# Patient Record
Sex: FEMALE | Race: WHITE | NOT HISPANIC OR LATINO | Employment: UNEMPLOYED | URBAN - METROPOLITAN AREA
[De-identification: names, ages, dates, MRNs, and addresses within clinical notes are randomized per-mention and may not be internally consistent; named-entity substitution may affect disease eponyms.]

---

## 2020-06-04 ENCOUNTER — TELEMEDICINE (OUTPATIENT)
Dept: FAMILY MEDICINE CLINIC | Facility: CLINIC | Age: 55
End: 2020-06-04

## 2020-06-04 DIAGNOSIS — G25.81 RESTLESS LEG SYNDROME: ICD-10-CM

## 2020-06-04 DIAGNOSIS — E11.40 TYPE 2 DIABETES MELLITUS WITH DIABETIC NEUROPATHY, WITHOUT LONG-TERM CURRENT USE OF INSULIN (HCC): Primary | ICD-10-CM

## 2020-06-04 DIAGNOSIS — E53.8 VITAMIN B12 DEFICIENCY: ICD-10-CM

## 2020-06-04 DIAGNOSIS — E78.5 HYPERLIPIDEMIA, UNSPECIFIED HYPERLIPIDEMIA TYPE: ICD-10-CM

## 2020-06-04 DIAGNOSIS — I10 ESSENTIAL HYPERTENSION: ICD-10-CM

## 2020-06-04 DIAGNOSIS — G47.00 INSOMNIA, UNSPECIFIED TYPE: ICD-10-CM

## 2020-06-04 DIAGNOSIS — N95.1 PERIMENOPAUSE: ICD-10-CM

## 2020-06-04 PROBLEM — G43.909 MIGRAINES: Status: ACTIVE | Noted: 2020-06-04

## 2020-06-04 PROBLEM — T78.40XA ALLERGIES: Status: ACTIVE | Noted: 2020-06-04

## 2020-06-04 PROCEDURE — 99203 OFFICE O/P NEW LOW 30 MIN: CPT | Performed by: NURSE PRACTITIONER

## 2020-06-04 RX ORDER — PRAMIPEXOLE DIHYDROCHLORIDE 0.12 MG/1
0.12 TABLET ORAL NIGHTLY
Qty: 30 TABLET | Refills: 1 | Status: SHIPPED | OUTPATIENT
Start: 2020-06-04 | End: 2020-07-16 | Stop reason: SDUPTHER

## 2020-06-04 RX ORDER — CYCLOBENZAPRINE HCL 5 MG
TABLET ORAL
COMMUNITY
Start: 2019-07-11 | End: 2020-06-04 | Stop reason: SDUPTHER

## 2020-06-04 RX ORDER — CYANOCOBALAMIN (VITAMIN B-12) 1000 MCG
TABLET ORAL
COMMUNITY
End: 2020-06-12

## 2020-06-04 RX ORDER — OLMESARTAN MEDOXOMIL 40 MG/1
TABLET ORAL
COMMUNITY
End: 2020-06-04 | Stop reason: SDUPTHER

## 2020-06-04 RX ORDER — MULTIVITAMIN
TABLET ORAL
COMMUNITY
End: 2020-07-28

## 2020-06-04 RX ORDER — ATORVASTATIN CALCIUM 20 MG/1
20 TABLET, FILM COATED ORAL NIGHTLY
Qty: 30 TABLET | Refills: 1 | Status: SHIPPED | OUTPATIENT
Start: 2020-06-04 | End: 2020-07-28 | Stop reason: SDUPTHER

## 2020-06-04 RX ORDER — SUMATRIPTAN 50 MG/1
TABLET, FILM COATED ORAL
COMMUNITY

## 2020-06-04 RX ORDER — ETHYNODIOL DIACETATE AND ETHINYL ESTRADIOL 1 MG-35MCG
1 KIT ORAL DAILY
Qty: 28 TABLET | Refills: 0 | Status: SHIPPED | OUTPATIENT
Start: 2020-06-04 | End: 2020-07-07

## 2020-06-04 RX ORDER — CYCLOBENZAPRINE HCL 5 MG
5 TABLET ORAL 3 TIMES DAILY PRN
Qty: 90 TABLET | Refills: 1 | Status: SHIPPED | OUTPATIENT
Start: 2020-06-04 | End: 2020-11-13 | Stop reason: SDUPTHER

## 2020-06-04 RX ORDER — CHLORTHALIDONE 25 MG/1
TABLET ORAL
COMMUNITY
End: 2020-06-04 | Stop reason: SDUPTHER

## 2020-06-04 RX ORDER — ATORVASTATIN CALCIUM 20 MG/1
TABLET, FILM COATED ORAL
COMMUNITY
End: 2020-06-04 | Stop reason: SDUPTHER

## 2020-06-04 RX ORDER — QUETIAPINE FUMARATE 25 MG/1
TABLET, FILM COATED ORAL
COMMUNITY
End: 2020-06-04 | Stop reason: SDUPTHER

## 2020-06-04 RX ORDER — FLUTICASONE PROPIONATE 50 MCG
SPRAY, SUSPENSION (ML) NASAL
COMMUNITY

## 2020-06-04 RX ORDER — QUETIAPINE FUMARATE 25 MG/1
25 TABLET, FILM COATED ORAL NIGHTLY
Qty: 30 TABLET | Refills: 1 | Status: SHIPPED | OUTPATIENT
Start: 2020-06-04 | End: 2020-07-28 | Stop reason: SDUPTHER

## 2020-06-04 RX ORDER — CLONAZEPAM 0.5 MG/1
TABLET ORAL
COMMUNITY
End: 2020-06-04

## 2020-06-04 RX ORDER — CHLORTHALIDONE 25 MG/1
25 TABLET ORAL DAILY
Qty: 30 TABLET | Refills: 1 | Status: SHIPPED | OUTPATIENT
Start: 2020-06-04 | End: 2020-06-12

## 2020-06-04 RX ORDER — ETHYNODIOL DIACETATE AND ETHINYL ESTRADIOL 1 MG-35MCG
KIT ORAL
COMMUNITY
End: 2020-06-04 | Stop reason: SDUPTHER

## 2020-06-04 RX ORDER — OLMESARTAN MEDOXOMIL 40 MG/1
40 TABLET ORAL DAILY
Qty: 30 TABLET | Refills: 1 | Status: SHIPPED | OUTPATIENT
Start: 2020-06-04 | End: 2020-07-28 | Stop reason: SDUPTHER

## 2020-06-04 RX ORDER — COVID-19 ANTIGEN TEST
KIT MISCELLANEOUS
COMMUNITY
End: 2020-06-12

## 2020-06-04 NOTE — PROGRESS NOTES
Subjective   Erin Bailey is a 54 y.o. female.     Chief Complaint   Patient presents with   • Diabetes   • Hypertension   • Hyperlipidemia     You have chosen to receive care through a telehealth visit.  Do you consent to use a video/audio connection for your medical care today? Yes    This is my first time seeing this patient.   History of Present Illness   Diabetes Mellitus Type II, Initial: Patient here for evaluation of Type 2 diabetes mellitus. Symptoms: neuropathy. Home monitoring: The patient is not checking blood sugars at home. Medications: The patient is adherent with their medication regimen. Medication(s): metformin. Due for: A1c, urine microalbumin and foot exam. Patient is not currently exercising.     Hypertension: Patient here for evaluation of essential hypertension. Blood pressure is well controlled at home.She is not exercising and is adherent to low salt diet. Home monitoring: The patient is not checking blood pressure at home. Symptoms: none. Medications: The patient is adherent with their medication regimen. Medication(s): Benicar and chlorthalidone. The patient is due for serum creatinine and lipid panel.    Hyperlipidemia: Symptoms: none. Medications:The patient is adherent with their medication regimen. Medication(s): statin. Her last labs are not available for review at this time. The patient is due for lipid panel and liver function tests.    Restless leg syndrome: Patient reports doing poorly. Current treatment includes clonazepam.    Insomnia: Patient reports doing fairly well. Current treatment includes Seroquel. Patient is adherent to medication regimen.     Migraine headache: Patient reports doing well. Current treatment includes sumatriptan.      The following portions of the patient's history were reviewed and updated as appropriate: allergies, current medications, past family history, past medical history, past social history, past surgical history and problem list.    No past  medical history on file.    No past surgical history on file.    No family history on file.    Social History     Socioeconomic History   • Marital status:      Spouse name: Not on file   • Number of children: Not on file   • Years of education: Not on file   • Highest education level: Not on file       Review of Systems   Eyes: Negative for visual disturbance.   Cardiovascular: Negative for chest pain and leg swelling.   Endocrine: Negative for polydipsia and polyuria.   Musculoskeletal: Negative for myalgias.   Neurological: Negative for headache.       Objective   There were no vitals filed for this visit.   There is no height or weight on file to calculate BMI.  Physical Exam   Constitutional: She is oriented to person, place, and time. She appears well-developed and well-nourished.   Pulmonary/Chest: Effort normal.   Neurological: She is alert and oriented to person, place, and time.   Psychiatric: She has a normal mood and affect.         Assessment/Plan   Erin was seen today for diabetes, hypertension and hyperlipidemia.    Diagnoses and all orders for this visit:    Type 2 diabetes mellitus with diabetic neuropathy, without long-term current use of insulin (CMS/[facility])  Comments:  - Patient received pneumococcal vaccination with prior PCP. Will obtain records from previous PCP.   Orders:  -     Hemoglobin A1c  -     Microalbumin / Creatinine Urine Ratio - Urine, Clean Catch  -     Ambulatory Referral for Diabetic Eye Exam-Ophthalmology  -     metFORMIN (GLUCOPHAGE) 1000 MG tablet; Take 1 tablet by mouth Every Night.    Essential hypertension  -     chlorthalidone (HYGROTON) 25 MG tablet; Take 1 tablet by mouth Daily.  -     olmesartan (BENICAR) 40 MG tablet; Take 1 tablet by mouth Daily.    Hyperlipidemia, unspecified hyperlipidemia type  -     Lipid Panel With / Chol / HDL Ratio  -     Comprehensive Metabolic Panel  -     atorvastatin (LIPITOR) 20 MG tablet; Take 1 tablet by mouth Every  Night.    Restless leg syndrome  Comments:  - Patient aware that I cannot refill clonazepam and is agreeable to try pramipexole.  - Patient will call in 1 week if dose needs to be increased.   Orders:  -     pramipexole (Mirapex) 0.125 MG tablet; Take 1 tablet by mouth Every Night.    Insomnia, unspecified type  -     QUEtiapine (SEROquel) 25 MG tablet; Take 1 tablet by mouth Every Night.    Vitamin B12 deficiency  -     Vitamin B12    Perimenopause  -     ethynodiol-ethinyl estradiol (KELNOR,ZOVIA) 1-35 MG-MCG per tablet; Take 1 tablet by mouth Daily.  -     Ambulatory Referral to Obstetrics / Gynecology    Other orders  -     cyclobenzaprine (FLEXERIL) 5 MG tablet; Take 1 tablet by mouth 3 (Three) Times a Day As Needed for Muscle Spasms.    Follow-up pending lab results.     Approximately 30 minutes spent with patient via video.

## 2020-06-12 ENCOUNTER — TELEPHONE (OUTPATIENT)
Dept: FAMILY MEDICINE CLINIC | Facility: CLINIC | Age: 55
End: 2020-06-12

## 2020-06-12 DIAGNOSIS — R79.89 ELEVATED SERUM CREATININE: ICD-10-CM

## 2020-06-12 DIAGNOSIS — E83.52 HYPERCALCEMIA: Primary | ICD-10-CM

## 2020-06-12 DIAGNOSIS — R79.89 ABNORMAL LIVER FUNCTION TESTS: ICD-10-CM

## 2020-06-12 LAB
ALBUMIN SERPL-MCNC: 4.7 G/DL (ref 3.5–5.2)
ALBUMIN/CREAT UR: 98 MG/G CREAT (ref 0–29)
ALBUMIN/GLOB SERPL: 1.2 G/DL
ALP SERPL-CCNC: 65 U/L (ref 39–117)
ALT SERPL-CCNC: 36 U/L (ref 1–33)
AST SERPL-CCNC: 39 U/L (ref 1–32)
BILIRUB SERPL-MCNC: 0.3 MG/DL (ref 0.2–1.2)
BUN SERPL-MCNC: 25 MG/DL (ref 6–20)
BUN/CREAT SERPL: 15.7 (ref 7–25)
CALCIUM SERPL-MCNC: 13.4 MG/DL (ref 8.6–10.5)
CHLORIDE SERPL-SCNC: 95 MMOL/L (ref 98–107)
CHOLEST SERPL-MCNC: 147 MG/DL (ref 0–200)
CHOLEST/HDLC SERPL: 2.58 {RATIO}
CO2 SERPL-SCNC: 25 MMOL/L (ref 22–29)
CREAT SERPL-MCNC: 1.59 MG/DL (ref 0.57–1)
CREAT UR-MCNC: 423.8 MG/DL
GLOBULIN SER CALC-MCNC: 3.8 GM/DL
GLUCOSE SERPL-MCNC: 132 MG/DL (ref 65–99)
HBA1C MFR BLD: 6.7 % (ref 4.8–5.6)
HDLC SERPL-MCNC: 57 MG/DL (ref 40–60)
LDLC SERPL CALC-MCNC: 33 MG/DL (ref 0–100)
MICROALBUMIN UR-MCNC: 416.5 UG/ML
POTASSIUM SERPL-SCNC: 4.4 MMOL/L (ref 3.5–5.2)
PROT SERPL-MCNC: 8.5 G/DL (ref 6–8.5)
SODIUM SERPL-SCNC: 137 MMOL/L (ref 136–145)
TRIGL SERPL-MCNC: 287 MG/DL (ref 0–150)
VIT B12 SERPL-MCNC: >2000 PG/ML (ref 211–946)
VLDLC SERPL CALC-MCNC: 57.4 MG/DL

## 2020-06-12 NOTE — TELEPHONE ENCOUNTER
What is your current height? Diabetes is well controlled with A1c of 6.7% but due to kidney function will need to discontinue metformin at this time. Urine microalbumin is elevated. Triglycerides are elevated so may consider adding fish oil or fenofibrate. Creatinine is elevated at 1.59 so would recommend discontinuing naproxen and all NSAIDs as well as chlorthalidone. Calcium is elevated at 13.4 so will need to discontinue multivitamin, increase water intake and repeat calcium with PTH on Monday. Liver function tests are elevated so will need to repeat liver function tests with hepatitis panel. May consider RUQ US. B12 is high so would recommend discontinuing B12 supplementation.    Patient's current height is 67 inches. Will continue metformin at this time, but if creatinine remains elevated in 2 weeks then will discontinue. Patient will start fish oil for triglycerides. Patient has been taking Tums so will switch to Pepcid.

## 2020-06-15 ENCOUNTER — RESULTS ENCOUNTER (OUTPATIENT)
Dept: FAMILY MEDICINE CLINIC | Facility: CLINIC | Age: 55
End: 2020-06-15

## 2020-06-15 DIAGNOSIS — E83.52 HYPERCALCEMIA: ICD-10-CM

## 2020-06-16 LAB
CALCIUM SERPL-MCNC: 10.1 MG/DL (ref 8.7–10.2)
INTACT PTH: ABNORMAL
PTH-INTACT SERPL-MCNC: 10 PG/ML (ref 15–65)

## 2020-06-18 ENCOUNTER — TELEPHONE (OUTPATIENT)
Dept: FAMILY MEDICINE CLINIC | Facility: CLINIC | Age: 55
End: 2020-06-18

## 2020-06-18 NOTE — TELEPHONE ENCOUNTER
Spoke to PT. They are aware of all results and understand.  Will call back in a month to schedule labs.

## 2020-07-05 DIAGNOSIS — N95.1 PERIMENOPAUSE: ICD-10-CM

## 2020-07-07 RX ORDER — ETHYNODIOL DIACETATE AND ETHINYL ESTRADIOL 1 MG-35MCG
KIT ORAL
Qty: 28 TABLET | Refills: 0 | Status: SHIPPED | OUTPATIENT
Start: 2020-07-07 | End: 2020-07-28 | Stop reason: SDUPTHER

## 2020-07-16 ENCOUNTER — TELEPHONE (OUTPATIENT)
Dept: FAMILY MEDICINE CLINIC | Facility: CLINIC | Age: 55
End: 2020-07-16

## 2020-07-16 DIAGNOSIS — G25.81 RESTLESS LEG SYNDROME: ICD-10-CM

## 2020-07-16 RX ORDER — PRAMIPEXOLE DIHYDROCHLORIDE 0.25 MG/1
0.25 TABLET ORAL NIGHTLY
Qty: 30 TABLET | Refills: 1 | Status: SHIPPED | OUTPATIENT
Start: 2020-07-16 | End: 2020-09-10

## 2020-07-16 NOTE — TELEPHONE ENCOUNTER
Patient would like to discuss the message about her Seroquel which she has been taking for years, she wants to know what the options are.

## 2020-07-28 ENCOUNTER — OFFICE VISIT (OUTPATIENT)
Dept: FAMILY MEDICINE CLINIC | Facility: CLINIC | Age: 55
End: 2020-07-28

## 2020-07-28 VITALS
OXYGEN SATURATION: 97 % | HEART RATE: 126 BPM | TEMPERATURE: 97.6 F | WEIGHT: 284.4 LBS | HEIGHT: 67 IN | BODY MASS INDEX: 44.64 KG/M2 | DIASTOLIC BLOOD PRESSURE: 82 MMHG | SYSTOLIC BLOOD PRESSURE: 130 MMHG

## 2020-07-28 DIAGNOSIS — I10 ESSENTIAL HYPERTENSION: Primary | ICD-10-CM

## 2020-07-28 DIAGNOSIS — E78.5 HYPERLIPIDEMIA, UNSPECIFIED HYPERLIPIDEMIA TYPE: ICD-10-CM

## 2020-07-28 DIAGNOSIS — R79.89 ABNORMAL LIVER FUNCTION TESTS: ICD-10-CM

## 2020-07-28 DIAGNOSIS — G47.00 INSOMNIA, UNSPECIFIED TYPE: ICD-10-CM

## 2020-07-28 DIAGNOSIS — N95.1 PERIMENOPAUSE: ICD-10-CM

## 2020-07-28 DIAGNOSIS — R79.89 LOW SERUM PARATHYROID HORMONE (PTH): ICD-10-CM

## 2020-07-28 PROCEDURE — 99213 OFFICE O/P EST LOW 20 MIN: CPT | Performed by: NURSE PRACTITIONER

## 2020-07-28 RX ORDER — QUETIAPINE FUMARATE 25 MG/1
25 TABLET, FILM COATED ORAL NIGHTLY
Qty: 30 TABLET | Refills: 1 | Status: SHIPPED | OUTPATIENT
Start: 2020-07-28 | End: 2020-10-12

## 2020-07-28 RX ORDER — ETHYNODIOL DIACETATE AND ETHINYL ESTRADIOL 1 MG-35MCG
1 KIT ORAL DAILY
Qty: 28 TABLET | Refills: 2 | Status: SHIPPED | OUTPATIENT
Start: 2020-07-28 | End: 2020-10-06

## 2020-07-28 RX ORDER — OLMESARTAN MEDOXOMIL 40 MG/1
40 TABLET ORAL DAILY
Qty: 30 TABLET | Refills: 2 | Status: SHIPPED | OUTPATIENT
Start: 2020-07-28 | End: 2020-10-27

## 2020-07-28 RX ORDER — ATORVASTATIN CALCIUM 20 MG/1
20 TABLET, FILM COATED ORAL NIGHTLY
Qty: 30 TABLET | Refills: 2 | Status: SHIPPED | OUTPATIENT
Start: 2020-07-28 | End: 2020-10-27

## 2020-07-28 NOTE — PROGRESS NOTES
Subjective   Erin Bailey is a 54 y.o. female.     Chief Complaint   Patient presents with   • Hypertension       History of Present Illness     Hypertension: Patient here for follow-up of essential hypertension. Blood pressure is well controlled at home.She is not exercising and is adherent to low salt diet. Home monitoring: The patient is not checking blood pressure at home. Symptoms: none. Medications: The patient is adherent with their medication regimen. Medication(s): ARB. The patient is due for Serum creatinine.    Hyperlipidemia: Symptoms: none. Medications:The patient is adherent with their medication regimen. Medication(s): statin and fish oil. Her last labs showed total cholesterol 147, HDL 57, LDL 33,  triglycerides 287. The patient is due for liver function tests .    The following portions of the patient's history were reviewed and updated as appropriate: allergies, current medications, past family history, past medical history, past social history, past surgical history and problem list.    Past Medical History:   Diagnosis Date   • Allergic        Past Surgical History:   Procedure Laterality Date   • BREAST SURGERY     • CHOLECYSTECTOMY     • HERNIA REPAIR     • TONSILLECTOMY         History reviewed. No pertinent family history.    Social History     Socioeconomic History   • Marital status:      Spouse name: Not on file   • Number of children: Not on file   • Years of education: Not on file   • Highest education level: Not on file   Tobacco Use   • Smoking status: Never Smoker   • Smokeless tobacco: Never Used   Substance and Sexual Activity   • Alcohol use: Yes     Comment: socially    • Drug use: Never       Review of Systems   Eyes: Negative for visual disturbance.   Cardiovascular: Negative for chest pain and leg swelling.       Objective   Vitals:    07/28/20 1301   BP: 130/82   Pulse: (!) 126   Temp: 97.6 °F (36.4 °C)   TempSrc: Temporal   SpO2: 97%   Weight: 129 kg (284 lb 6.4  "oz)   Height: 170.2 cm (67\")      Body mass index is 44.54 kg/m².  Physical Exam   Constitutional: She is oriented to person, place, and time. She appears well-developed and well-nourished.   Cardiovascular: Normal rate, regular rhythm and normal heart sounds.   Pulmonary/Chest: Effort normal and breath sounds normal.   Musculoskeletal: She exhibits no edema.   Neurological: She is alert and oriented to person, place, and time.   Skin: Skin is warm and dry.   Psychiatric: She has a normal mood and affect.   Nursing note and vitals reviewed.        Assessment/Plan   Erin was seen today for hypertension.    Diagnoses and all orders for this visit:    Essential hypertension  -     olmesartan (BENICAR) 40 MG tablet; Take 1 tablet by mouth Daily.  -     Comprehensive Metabolic Panel    Abnormal liver function tests  -     Comprehensive Metabolic Panel  -     Hepatitis panel, acute    Low serum parathyroid hormone (PTH)  -     PTH, Intact    Hyperlipidemia, unspecified hyperlipidemia type  -     atorvastatin (LIPITOR) 20 MG tablet; Take 1 tablet by mouth Every Night.    Insomnia, unspecified type  -     QUEtiapine (SEROquel) 25 MG tablet; Take 1 tablet by mouth Every Night.    Perimenopause  -     ethynodiol-ethinyl estradiol (Kelnor 1/35) 1-35 MG-MCG per tablet; Take 1 tablet by mouth Daily.               "

## 2020-07-29 LAB
ALBUMIN SERPL-MCNC: 4.2 G/DL (ref 3.5–5.2)
ALBUMIN/GLOB SERPL: 1.3 G/DL
ALP SERPL-CCNC: 61 U/L (ref 39–117)
ALT SERPL-CCNC: 16 U/L (ref 1–33)
AST SERPL-CCNC: 25 U/L (ref 1–32)
BILIRUB SERPL-MCNC: 0.2 MG/DL (ref 0–1.2)
BUN SERPL-MCNC: 19 MG/DL (ref 6–20)
BUN/CREAT SERPL: 14.8 (ref 7–25)
CALCIUM SERPL-MCNC: 10.1 MG/DL (ref 8.6–10.5)
CHLORIDE SERPL-SCNC: 99 MMOL/L (ref 98–107)
CO2 SERPL-SCNC: 22.1 MMOL/L (ref 22–29)
CREAT SERPL-MCNC: 1.28 MG/DL (ref 0.57–1)
GLOBULIN SER CALC-MCNC: 3.2 GM/DL
GLUCOSE SERPL-MCNC: 122 MG/DL (ref 65–99)
HAV IGM SERPL QL IA: NEGATIVE
HBV CORE IGM SERPL QL IA: NEGATIVE
HBV SURFACE AG SERPL QL IA: NEGATIVE
HCV AB S/CO SERPL IA: <0.1 S/CO RATIO (ref 0–0.9)
POTASSIUM SERPL-SCNC: 5 MMOL/L (ref 3.5–5.2)
PROT SERPL-MCNC: 7.4 G/DL (ref 6–8.5)
PTH-INTACT SERPL-MCNC: 15 PG/ML (ref 15–65)
SODIUM SERPL-SCNC: 135 MMOL/L (ref 136–145)

## 2020-07-30 ENCOUNTER — TELEPHONE (OUTPATIENT)
Dept: FAMILY MEDICINE CLINIC | Facility: CLINIC | Age: 55
End: 2020-07-30

## 2020-07-30 RX ORDER — PIOGLITAZONEHYDROCHLORIDE 15 MG/1
15 TABLET ORAL DAILY
Qty: 30 TABLET | Refills: 2 | Status: SHIPPED | OUTPATIENT
Start: 2020-07-30 | End: 2020-10-27

## 2020-07-30 NOTE — TELEPHONE ENCOUNTER
Kidney function tests have improved but will still need to discontinue metformin at this time. We can consider adding Januvia 50 mg daily or Actos 15 mg daily and repeat A1c in 3 months. Calcium remains normal. Liver function tests have returned to normal. Hepatitis panel is negative. PTH has returned to normal.    Patient aware of results and recommendations.

## 2020-09-10 DIAGNOSIS — G25.81 RESTLESS LEG SYNDROME: ICD-10-CM

## 2020-09-10 RX ORDER — PRAMIPEXOLE DIHYDROCHLORIDE 0.25 MG/1
TABLET ORAL
Qty: 30 TABLET | Refills: 0 | Status: SHIPPED | OUTPATIENT
Start: 2020-09-10 | End: 2020-09-21

## 2020-09-21 DIAGNOSIS — G25.81 RESTLESS LEG SYNDROME: ICD-10-CM

## 2020-09-21 RX ORDER — PRAMIPEXOLE DIHYDROCHLORIDE 0.25 MG/1
TABLET ORAL
Qty: 30 TABLET | Refills: 0 | Status: SHIPPED | OUTPATIENT
Start: 2020-09-21 | End: 2020-10-12

## 2020-10-06 ENCOUNTER — OFFICE VISIT (OUTPATIENT)
Dept: OBSTETRICS AND GYNECOLOGY | Age: 55
End: 2020-10-06

## 2020-10-06 VITALS
SYSTOLIC BLOOD PRESSURE: 130 MMHG | DIASTOLIC BLOOD PRESSURE: 82 MMHG | HEIGHT: 67 IN | WEIGHT: 289.2 LBS | BODY MASS INDEX: 45.39 KG/M2

## 2020-10-06 DIAGNOSIS — Z12.31 SCREENING MAMMOGRAM, ENCOUNTER FOR: ICD-10-CM

## 2020-10-06 DIAGNOSIS — Z12.11 SCREENING FOR COLON CANCER: ICD-10-CM

## 2020-10-06 DIAGNOSIS — Z01.419 WELL WOMAN EXAM WITH ROUTINE GYNECOLOGICAL EXAM: Primary | ICD-10-CM

## 2020-10-06 PROCEDURE — 99396 PREV VISIT EST AGE 40-64: CPT | Performed by: NURSE PRACTITIONER

## 2020-10-06 RX ORDER — CHLORAL HYDRATE 500 MG
CAPSULE ORAL
COMMUNITY

## 2020-10-06 NOTE — PROGRESS NOTES
[facility]  Routine Annual Visit    10/6/2020    Patient: Erin Bailey          MR#:[id number]      History of Present Illness    55 y.o. female  who presents for annual exam as a new patient.    She reports consistent GYN care in [place], [place]- Dr. Elisa Manriquez.  Denies any significant GYN history or abnormal pap smears.  Last GYN visit 2019- reports normal exam and pap    Erin reports last mammogram 2019 and normal. She states in 2016 she had bilateral breast excisions followed by an abscess of left breast. Report path returned benign and she has no breast complaints or concerns today.  Reports last colonoscopy over 10 years ago- accepts referral today    Erin is currently on oral contraceptives. Advised to discontinue with her age and co-morbidities. She understands and will d/c. She is not sexually active.  She has been cycling on the oral contraceptives and reports over the past few months the bleeding has been irregular and unpredictable    She has been established with primary care since moving to [place].    Patient's last menstrual period was 2020 (exact date).  Obstetric History:  OB History        0    Para   0    Term   0       0    AB   0    Living   0       SAB   0    TAB   0    Ectopic   0    Molar   0    Multiple   0    Live Births   0               Menstrual History:     Patient's last menstrual period was 2020 (exact date).       Sexual History:       ________________________________________  Patient Active Problem List   Diagnosis   • Allergies   • Diabetic neuropathy (CMS/HCC)   • High blood pressure   • Insomnia   • Migraines   • Restless leg syndrome   • Type 2 diabetes mellitus (CMS/HCC)       Past Medical History:   Diagnosis Date   • Allergic    • Diabetes mellitus (CMS/HCC)    • Hyperlipemia    • Hypertension    • Insomnia    • Knee injury     left   • Migraine    • Restless leg        Past Surgical History:   Procedure Laterality  Date   • BREAST BIOPSY Bilateral     with excision   • BREAST SURGERY     • CHOLECYSTECTOMY     • CHOLECYSTECTOMY     • HERNIA REPAIR     • HERNIA REPAIR     • TONSILLECTOMY         Social History     Tobacco Use   Smoking Status Never Smoker   Smokeless Tobacco Never Used       Family History   Problem Relation Age of Onset   • Prostate cancer Father    • Colon cancer Sister    • Diabetes Maternal Grandmother        Prior to Admission medications    Medication Sig Start Date End Date Taking? Authorizing Provider   atorvastatin (LIPITOR) 20 MG tablet Take 1 tablet by mouth Every Night. 7/28/20  Yes Essence Neal APRN   Cetirizine HCl (ZyrTEC Allergy) 10 MG capsule    Yes Tiny Weems MD   cyclobenzaprine (FLEXERIL) 5 MG tablet Take 1 tablet by mouth 3 (Three) Times a Day As Needed for Muscle Spasms. 6/4/20  Yes Essence Neal APRN   ethynodiol-ethinyl estradiol (Kelnor 1/35) 1-35 MG-MCG per tablet Take 1 tablet by mouth Daily. 7/28/20  Yes Essence Neal APRN   fluticasone (FLONASE) 50 MCG/ACT nasal spray    Yes Tiny Weems MD   olmesartan (BENICAR) 40 MG tablet Take 1 tablet by mouth Daily. 7/28/20  Yes Essence Neal APRN   Omega-3 Fatty Acids (fish oil) 1000 MG capsule capsule Take  by mouth Daily With Breakfast.   Yes Tiny Weems MD   pioglitazone (Actos) 15 MG tablet Take 1 tablet by mouth Daily. 7/30/20  Yes Essence Neal APRN   pramipexole (MIRAPEX) 0.25 MG tablet TAKE 1 TABLET BY MOUTH ONCE DAILY AT NIGHT 9/21/20  Yes Essence Neal APRN   QUEtiapine (SEROquel) 25 MG tablet Take 1 tablet by mouth Every Night. 7/28/20  Yes Essence Neal APRN   SUMAtriptan (IMITREX) 50 MG tablet    Yes Tiny Weems MD     ________________________________________    Current contraception: abstinence  History of abnormal Pap smear: no  Family history of uterine or ovarian cancer: no  Family History of colon cancer/colon polyps: yes - sister- colonoscopy referral  "placed  History of abnormal mammogram: yes - reports follow ups normal- 3D mammogram placed today  History of abnormal lipids: yes - on lipitor 20    The following portions of the patient's history were reviewed and updated as appropriate: allergies, current medications, past family history, past medical history, past social history, past surgical history and problem list.    Review of Systems   Constitutional: Negative.    HENT: Negative.    Eyes: Negative for visual disturbance.   Respiratory: Negative for cough, shortness of breath and wheezing.    Cardiovascular: Negative for chest pain, palpitations and leg swelling.   Gastrointestinal: Negative for abdominal distention, abdominal pain, blood in stool, constipation, diarrhea, nausea and vomiting.   Endocrine: Negative for cold intolerance and heat intolerance.   Genitourinary: Positive for menstrual problem. Negative for difficulty urinating, dyspareunia, dysuria, frequency, genital sores, hematuria, pelvic pain, urgency, vaginal bleeding, vaginal discharge and vaginal pain.   Musculoskeletal: Negative.    Skin: Negative.    Neurological: Negative for dizziness, weakness, light-headedness, numbness and headaches.   Hematological: Negative.    Psychiatric/Behavioral: Negative.    Breasts: negative for lumps skin changes, dimpling, swelling, nipple changes/discharge bilaterally         Objective   Physical Exam    /82   Ht 170.2 cm (67\")   Wt 131 kg (289 lb 3.2 oz)   LMP 09/22/2020 (Exact Date)   BMI 45.30 kg/m²    BP Readings from Last 3 Encounters:   10/06/20 130/82   07/28/20 130/82      Wt Readings from Last 3 Encounters:   10/06/20 131 kg (289 lb 3.2 oz)   07/28/20 129 kg (284 lb 6.4 oz)        BMI: Estimated body mass index is 45.3 kg/m² as calculated from the following:    Height as of this encounter: 170.2 cm (67\").    Weight as of this encounter: 131 kg (289 lb 3.2 oz).        General:   alert, appears stated age and cooperative   Heart: " regular rate and rhythm, S1, S2 normal, no murmur, click, rub or gallop   Lungs: clear to auscultation bilaterally   Abdomen: soft, non-tender, without masses or organomegaly   Breast: inspection negative, no nipple discharge or bleeding, no masses or nodularity palpable   Vulva: normal   Vagina: normal mucosa, normal discharge   Cervix: no cervical motion tenderness and no lesions   Uterus: exam is limited habitus    Adnexa: exam limited by habitus     Assessment:    1. Normal annual exam   2. AUB on OCPs- discontinue OCPs. Return for US and further evaluation 2-4 weeks. Advised her AUB needs to be further evaluated. She understands  3. Healthy lifestyle modifications discussed, counseled on self breast exams and bone health    All of the patient's questions were addressed and answered, I have encouraged her to call for today's test results if she has not received them within 10 days.  Patient is advised to call with any change in her condition or with any other questions, otherwise return in 12 months for annual examination.    Plan:    []  Rx:   [x]  Mammogram request made  [x]  PAP done  []  Occult fecal blood test ([facility])  []  Labs:   []  GC/Chl/TV  []  DEXA scan   [x]  Referral for colonoscopy:           ETHEL Mendosa  10/6/2020 15:16 EDT

## 2020-10-08 LAB
CYTOLOGIST CVX/VAG CYTO: NORMAL
CYTOLOGY CVX/VAG DOC CYTO: NORMAL
CYTOLOGY CVX/VAG DOC THIN PREP: NORMAL
DX ICD CODE: NORMAL
HIV 1 & 2 AB SER-IMP: NORMAL
HPV I/H RISK 4 DNA CVX QL PROBE+SIG AMP: NEGATIVE
OTHER STN SPEC: NORMAL
STAT OF ADQ CVX/VAG CYTO-IMP: NORMAL

## 2020-10-09 ENCOUNTER — TELEPHONE (OUTPATIENT)
Dept: OBSTETRICS AND GYNECOLOGY | Age: 55
End: 2020-10-09

## 2020-10-09 NOTE — TELEPHONE ENCOUNTER
----- Message from ETHEL Carvajal sent at 10/9/2020  8:34 AM EDT -----  Please inform patient her pap smear returned negative (normal). Thank you.

## 2020-10-12 DIAGNOSIS — G25.81 RESTLESS LEG SYNDROME: ICD-10-CM

## 2020-10-12 DIAGNOSIS — G47.00 INSOMNIA, UNSPECIFIED TYPE: ICD-10-CM

## 2020-10-12 RX ORDER — QUETIAPINE FUMARATE 25 MG/1
TABLET, FILM COATED ORAL
Qty: 30 TABLET | Refills: 0 | Status: SHIPPED | OUTPATIENT
Start: 2020-10-12 | End: 2020-11-09

## 2020-10-12 RX ORDER — PRAMIPEXOLE DIHYDROCHLORIDE 0.5 MG/1
0.5 TABLET ORAL NIGHTLY
Qty: 30 TABLET | Refills: 3 | Status: SHIPPED | OUTPATIENT
Start: 2020-10-12 | End: 2021-02-04

## 2020-10-13 ENCOUNTER — TELEPHONE (OUTPATIENT)
Dept: OBSTETRICS AND GYNECOLOGY | Age: 55
End: 2020-10-13

## 2020-10-13 PROBLEM — N93.9 ABNORMAL UTERINE BLEEDING (AUB): Status: ACTIVE | Noted: 2020-10-13

## 2020-10-13 NOTE — TELEPHONE ENCOUNTER
Patient called and stated she has had 3 periods this month.  She would like for you to call her about taking iron pills.

## 2020-10-13 NOTE — TELEPHONE ENCOUNTER
Called pt to offer her appt on Friday 10/16 at 11:15, she will come in early to be worked in for US, per Berenice.

## 2020-10-13 NOTE — TELEPHONE ENCOUNTER
Rosy- would you please call patient and check in. I don't see a recent CBC or hemoglobin in her chart. Dr. Delgado may get CBC at her upcoming visit. Of course, if bleeding has been significant (more than 1 pad per hour) or she is experiencing lightheadedness, dizziness she would need immediate evaluation in ER. Otherwise I recommend keeping her upcoming appt with Dr. Delgado. Thank you.

## 2020-10-16 ENCOUNTER — PROCEDURE VISIT (OUTPATIENT)
Dept: OBSTETRICS AND GYNECOLOGY | Age: 55
End: 2020-10-16

## 2020-10-16 ENCOUNTER — OFFICE VISIT (OUTPATIENT)
Dept: OBSTETRICS AND GYNECOLOGY | Age: 55
End: 2020-10-16

## 2020-10-16 VITALS
DIASTOLIC BLOOD PRESSURE: 80 MMHG | HEIGHT: 67 IN | BODY MASS INDEX: 45.99 KG/M2 | WEIGHT: 293 LBS | SYSTOLIC BLOOD PRESSURE: 136 MMHG

## 2020-10-16 DIAGNOSIS — F50.89 PICA IN ADULTS: ICD-10-CM

## 2020-10-16 DIAGNOSIS — N93.9 ABNORMAL UTERINE BLEEDING (AUB): Primary | ICD-10-CM

## 2020-10-16 DIAGNOSIS — Z13.0 SCREENING, ANEMIA, DEFICIENCY, IRON: ICD-10-CM

## 2020-10-16 DIAGNOSIS — N92.1 MENORRHAGIA WITH IRREGULAR CYCLE: Primary | ICD-10-CM

## 2020-10-16 DIAGNOSIS — Z13.228 SCREENING FOR METABOLIC DISORDER: ICD-10-CM

## 2020-10-16 DIAGNOSIS — E11.9 TYPE 2 DIABETES MELLITUS WITHOUT COMPLICATION, WITHOUT LONG-TERM CURRENT USE OF INSULIN (HCC): ICD-10-CM

## 2020-10-16 PROCEDURE — 99214 OFFICE O/P EST MOD 30 MIN: CPT | Performed by: OBSTETRICS & GYNECOLOGY

## 2020-10-16 PROCEDURE — 76830 TRANSVAGINAL US NON-OB: CPT | Performed by: OBSTETRICS & GYNECOLOGY

## 2020-10-16 NOTE — PROGRESS NOTES
10/16/2020      Patient:  Erin Bailey   MR#:[id number]    Office note    Chief Complaint   Patient presents with   • abnormal menstrual cycle     pt got off of birth control and is having irregular menstrual cycles        Subjective     History of Present Illness  55 y.o. female  presents for follow-up visit with report of intermittent moderate abnormal uterine bleeding for the past month or so.  The patient stopped her oral contraceptive pills per nurse practitioner's instructions last visit.  The patient had a subsequent expected withdrawal bleed and then intermittent moderate abnormal uterine bleeding for several episodes in the month following.    Ultrasound today shows a small uterus with endometrial lining measuring 6.15 mm.    Past medical history is complicated by obesity and type 2 diabetes.    In the last month or so the patient's reported intermittent moderate symptoms of craving ice and pica type symptoms.  He also reports intermittent moderate exacerbation of her restless leg syndrome in the last month.    Patient Active Problem List   Diagnosis   • Allergies   • Diabetic neuropathy (CMS/HCC)   • High blood pressure   • Insomnia   • Migraines   • Restless leg syndrome   • Type 2 diabetes mellitus without complication, without long-term current use of insulin (CMS/HCC)   • Abnormal uterine bleeding (AUB)       Past Medical History:   Diagnosis Date   • Allergic    • Diabetes mellitus (CMS/HCC)    • Hyperlipemia    • Hypertension    • Insomnia    • Knee injury     left   • Migraine    • Restless leg      Past Surgical History:   Procedure Laterality Date   • BREAST BIOPSY Bilateral     with excision   • BREAST SURGERY     • CHOLECYSTECTOMY     • CHOLECYSTECTOMY     • HERNIA REPAIR     • HERNIA REPAIR     • TONSILLECTOMY       Obstetric History:  OB History        0    Para   0    Term   0       0    AB   0    Living   0       SAB   0    TAB   0    Ectopic   0    Molar   0     Multiple   0    Live Births   0               Menstrual History:     Patient's last menstrual period was 10/09/2020.       The patient has never been pregnant.  Family History   Problem Relation Age of Onset   • Prostate cancer Father    • Colon cancer Sister    • Diabetes Maternal Grandmother      Social History     Tobacco Use   • Smoking status: Never Smoker   • Smokeless tobacco: Never Used   Substance Use Topics   • Alcohol use: Yes     Comment: socially    • Drug use: Never     Sulfa antibiotics    Current Outpatient Medications:   •  atorvastatin (LIPITOR) 20 MG tablet, Take 1 tablet by mouth Every Night., Disp: 30 tablet, Rfl: 2  •  Cetirizine HCl (ZyrTEC Allergy) 10 MG capsule, , Disp: , Rfl:   •  cyclobenzaprine (FLEXERIL) 5 MG tablet, Take 1 tablet by mouth 3 (Three) Times a Day As Needed for Muscle Spasms., Disp: 90 tablet, Rfl: 1  •  fluticasone (FLONASE) 50 MCG/ACT nasal spray, , Disp: , Rfl:   •  olmesartan (BENICAR) 40 MG tablet, Take 1 tablet by mouth Daily., Disp: 30 tablet, Rfl: 2  •  Omega-3 Fatty Acids (fish oil) 1000 MG capsule capsule, Take  by mouth Daily With Breakfast., Disp: , Rfl:   •  pioglitazone (Actos) 15 MG tablet, Take 1 tablet by mouth Daily., Disp: 30 tablet, Rfl: 2  •  pramipexole (MIRAPEX) 0.5 MG tablet, Take 1 tablet by mouth Every Night., Disp: 30 tablet, Rfl: 3  •  QUEtiapine (SEROquel) 25 MG tablet, TAKE 1 TABLET BY MOUTH ONCE DAILY AT NIGHT, Disp: 30 tablet, Rfl: 0  •  SUMAtriptan (IMITREX) 50 MG tablet, , Disp: , Rfl:     The following portions of the patient's history were reviewed and updated as appropriate: allergies, current medications, past family history, past medical history, past social history, past surgical history and problem list.    Review of Systems   Constitutional: Negative.    Respiratory: Negative.    Cardiovascular: Negative.    Gastrointestinal: Negative.    Endocrine:        Pica symptoms craving ice   Genitourinary: Positive for menstrual problem and  "vaginal bleeding.   Neurological:        Moderate exacerbation of restless leg syndrome in the last month   Psychiatric/Behavioral: Negative.        BP Readings from Last 3 Encounters:   10/16/20 136/80   10/06/20 130/82   07/28/20 130/82      Wt Readings from Last 3 Encounters:   10/16/20 135 kg (296 lb 12.8 oz)   10/06/20 131 kg (289 lb 3.2 oz)   07/28/20 129 kg (284 lb 6.4 oz)      BMI: Estimated body mass index is 46.47 kg/m² as calculated from the following:    Height as of this encounter: 170.2 cm (67.01\").    Weight as of this encounter: 135 kg (296 lb 12.8 oz). BSA: Estimated body surface area is 2.39 meters squared as calculated from the following:    Height as of this encounter: 170.2 cm (67.01\").    Weight as of this encounter: 135 kg (296 lb 12.8 oz).    Objective   Physical Exam  Vitals signs and nursing note reviewed.   Constitutional:       Appearance: She is well-developed. She is obese. She is not ill-appearing.   HENT:      Head: Normocephalic and atraumatic.   Cardiovascular:      Rate and Rhythm: Normal rate.   Pulmonary:      Effort: Pulmonary effort is normal.   Abdominal:      General: Bowel sounds are normal. There is no distension.      Palpations: Abdomen is soft.      Tenderness: There is no abdominal tenderness.   Skin:     General: Skin is warm and dry.   Neurological:      Mental Status: She is alert and oriented to person, place, and time.   Psychiatric:         Behavior: Behavior normal.         Thought Content: Thought content normal.         Judgment: Judgment normal.         Assessment/Plan     Diagnoses and all orders for this visit:    1. Abnormal uterine bleeding (AUB) (Primary)  -     Follicle Stimulating Hormone    2. Screening, anemia, deficiency, iron  -     CBC (No Diff)    3. Pica in adults  -     Ferritin  -     CBC (No Diff)    4. Type 2 diabetes mellitus without complication, without long-term current use of insulin (CMS/[facility])  -     Hemoglobin A1c    5. Screening for " metabolic disorder  -     Comprehensive Metabolic Panel        Plan:  Evaluate FSH for perimenopausal bleeding.  Provera therapy may be indicated if the patient continues to have bleeding and has a perimenopausal FSH.  We discussed the possibility of need for endometrial sampling.  I do think it is unlikely the patient would have endometrial cancer having been on progesterone containing oral contraceptive pills.  With the patient's worsening symptoms of fatigue and new onset pica will she will be screened for anemia today.      No follow-ups on file.    Immanuel Delgado MD   10/16/2020 11:54 EDT

## 2020-10-17 LAB
ALBUMIN SERPL-MCNC: 4.1 G/DL (ref 3.5–5.2)
ALBUMIN/GLOB SERPL: 1.4 G/DL
ALP SERPL-CCNC: 80 U/L (ref 39–117)
ALT SERPL-CCNC: 30 U/L (ref 1–33)
AST SERPL-CCNC: 51 U/L (ref 1–32)
BILIRUB SERPL-MCNC: 0.2 MG/DL (ref 0–1.2)
BUN SERPL-MCNC: 16 MG/DL (ref 6–20)
BUN/CREAT SERPL: 14.8 (ref 7–25)
CALCIUM SERPL-MCNC: 9.8 MG/DL (ref 8.6–10.5)
CHLORIDE SERPL-SCNC: 98 MMOL/L (ref 98–107)
CO2 SERPL-SCNC: 23.1 MMOL/L (ref 22–29)
CREAT SERPL-MCNC: 1.08 MG/DL (ref 0.57–1)
ERYTHROCYTE [DISTWIDTH] IN BLOOD BY AUTOMATED COUNT: 12.4 % (ref 12.3–15.4)
FERRITIN SERPL-MCNC: 41.9 NG/ML (ref 13–150)
FSH SERPL-ACNC: 17.3 MIU/ML
GLOBULIN SER CALC-MCNC: 2.9 GM/DL
GLUCOSE SERPL-MCNC: 103 MG/DL (ref 65–99)
HBA1C MFR BLD: 6.6 % (ref 4.8–5.6)
HCT VFR BLD AUTO: 40.5 % (ref 34–46.6)
HGB BLD-MCNC: 13.3 G/DL (ref 12–15.9)
MCH RBC QN AUTO: 29.3 PG (ref 26.6–33)
MCHC RBC AUTO-ENTMCNC: 32.8 G/DL (ref 31.5–35.7)
MCV RBC AUTO: 89.2 FL (ref 79–97)
PLATELET # BLD AUTO: 404 10*3/MM3 (ref 140–450)
POTASSIUM SERPL-SCNC: 4.4 MMOL/L (ref 3.5–5.2)
PROT SERPL-MCNC: 7 G/DL (ref 6–8.5)
RBC # BLD AUTO: 4.54 10*6/MM3 (ref 3.77–5.28)
SODIUM SERPL-SCNC: 132 MMOL/L (ref 136–145)
WBC # BLD AUTO: 8.98 10*3/MM3 (ref 3.4–10.8)

## 2020-10-17 NOTE — PROGRESS NOTES
Notify the patient: Complete blood count and iron reserve are normal  Hemoglobin A1c is abnormal but slightly improved from previous readings suggesting better control of her diabetes.    FSH suggest the patient is not menopausal but the value was in the upper range of normal.    Her CMP suggest mild chronic renal insufficiency but improved from previous reading likely consequence of suboptimal control of diabetes.  A single liver function test is mildly elevated likely related to obesity and early onset fatty liver disease.  Most values on the CMP show stability or improvement.    Suggest continued hard work on optimal glycemic control

## 2020-10-27 DIAGNOSIS — E78.5 HYPERLIPIDEMIA, UNSPECIFIED HYPERLIPIDEMIA TYPE: ICD-10-CM

## 2020-10-27 DIAGNOSIS — I10 ESSENTIAL HYPERTENSION: ICD-10-CM

## 2020-10-27 RX ORDER — PIOGLITAZONEHYDROCHLORIDE 15 MG/1
TABLET ORAL
Qty: 30 TABLET | Refills: 0 | Status: SHIPPED | OUTPATIENT
Start: 2020-10-27 | End: 2020-12-01

## 2020-10-27 RX ORDER — ATORVASTATIN CALCIUM 20 MG/1
TABLET, FILM COATED ORAL
Qty: 30 TABLET | Refills: 0 | Status: SHIPPED | OUTPATIENT
Start: 2020-10-27 | End: 2020-12-08

## 2020-10-27 RX ORDER — OLMESARTAN MEDOXOMIL 40 MG/1
TABLET ORAL
Qty: 30 TABLET | Refills: 0 | Status: SHIPPED | OUTPATIENT
Start: 2020-10-27 | End: 2020-12-29

## 2020-11-09 DIAGNOSIS — G47.00 INSOMNIA, UNSPECIFIED TYPE: ICD-10-CM

## 2020-11-09 RX ORDER — QUETIAPINE FUMARATE 25 MG/1
TABLET, FILM COATED ORAL
Qty: 30 TABLET | Refills: 2 | Status: SHIPPED | OUTPATIENT
Start: 2020-11-09 | End: 2021-02-08

## 2020-11-13 ENCOUNTER — OFFICE VISIT (OUTPATIENT)
Dept: FAMILY MEDICINE CLINIC | Facility: CLINIC | Age: 55
End: 2020-11-13

## 2020-11-13 VITALS
OXYGEN SATURATION: 98 % | HEART RATE: 103 BPM | DIASTOLIC BLOOD PRESSURE: 101 MMHG | TEMPERATURE: 97.3 F | SYSTOLIC BLOOD PRESSURE: 155 MMHG | WEIGHT: 293 LBS | BODY MASS INDEX: 45.99 KG/M2 | HEIGHT: 67 IN

## 2020-11-13 DIAGNOSIS — M25.511 ACUTE PAIN OF RIGHT SHOULDER: Primary | ICD-10-CM

## 2020-11-13 PROCEDURE — 99213 OFFICE O/P EST LOW 20 MIN: CPT | Performed by: NURSE PRACTITIONER

## 2020-11-13 RX ORDER — CYCLOBENZAPRINE HCL 10 MG
10 TABLET ORAL NIGHTLY PRN
Qty: 30 TABLET | Refills: 1 | Status: SHIPPED | OUTPATIENT
Start: 2020-11-13 | End: 2020-12-31

## 2020-11-13 NOTE — PROGRESS NOTES
Subjective   Erin Bailey is a 55 y.o. female.     Chief Complaint   Patient presents with   • Pain     pain, burning, numbness right arm--no known injury       History of Present Illness   Shoulder pain: The right shoulder is affected.  There was no injury mechanism.  The quality of the pain is described as burning.  The pain does not radiate.  The pain is moderate.  Pertinent negatives include no muscle weakness or numbness.  The symptoms are aggravated by movement.  She has tried NSAIDs for the symptoms.  The treatment provided mild relief.         The following portions of the patient's history were reviewed and updated as appropriate: allergies, current medications, past family history, past medical history, past social history, past surgical history and problem list.    Past Medical History:   Diagnosis Date   • Allergic    • Diabetes mellitus (CMS/HCC)    • Hyperlipemia    • Hypertension    • Insomnia    • Knee injury     left   • Migraine    • Restless leg        Past Surgical History:   Procedure Laterality Date   • BREAST BIOPSY Bilateral     with excision   • BREAST SURGERY     • CHOLECYSTECTOMY     • CHOLECYSTECTOMY     • HERNIA REPAIR     • HERNIA REPAIR     • TONSILLECTOMY         Family History   Problem Relation Age of Onset   • Prostate cancer Father    • Colon cancer Sister    • Diabetes Maternal Grandmother        Social History     Socioeconomic History   • Marital status:      Spouse name: Not on file   • Number of children: Not on file   • Years of education: Not on file   • Highest education level: Not on file   Tobacco Use   • Smoking status: Never Smoker   • Smokeless tobacco: Never Used   Substance and Sexual Activity   • Alcohol use: Yes     Comment: socially    • Drug use: Never   • Sexual activity: Not Currently     Partners: Male       Review of Systems   Musculoskeletal:        See HPI        Objective   Vitals:    11/13/20 1158   BP: (!) 155/101   BP Location: Other  "(Comment)   Patient Position: Sitting   Cuff Size: Adult   Pulse: 103   Temp: 97.3 °F (36.3 °C)   TempSrc: Temporal   SpO2: 98%   Weight: 135 kg (297 lb 9.6 oz)   Height: 170.2 cm (67\")      Body mass index is 46.61 kg/m².  Physical Exam  Vitals signs and nursing note reviewed.   Constitutional:       Appearance: Normal appearance.   Neck:      Musculoskeletal: Normal range of motion.   Cardiovascular:      Rate and Rhythm: Normal rate and regular rhythm.   Pulmonary:      Effort: Pulmonary effort is normal.      Breath sounds: Normal breath sounds.   Musculoskeletal:      Left shoulder: She exhibits decreased range of motion and tenderness. She exhibits normal strength.   Skin:     General: Skin is warm and dry.   Neurological:      Mental Status: She is alert and oriented to person, place, and time.   Psychiatric:         Mood and Affect: Mood normal.           Assessment/Plan   Diagnoses and all orders for this visit:    1. Acute pain of right shoulder (Primary)  -     Ambulatory Referral to Orthopedic Surgery  -     cyclobenzaprine (FLEXERIL) 10 MG tablet; Take 1 tablet by mouth At Night As Needed for Muscle Spasms.  Dispense: 30 tablet; Refill: 1               "

## 2020-11-20 ENCOUNTER — HOSPITAL ENCOUNTER (OUTPATIENT)
Dept: MAMMOGRAPHY | Facility: HOSPITAL | Age: 55
Discharge: HOME | End: 2020-11-20
Admitting: NURSE PRACTITIONER

## 2020-11-20 DIAGNOSIS — Z12.31 SCREENING MAMMOGRAM, ENCOUNTER FOR: ICD-10-CM

## 2020-11-20 PROCEDURE — 77063 BREAST TOMOSYNTHESIS BI: CPT

## 2020-11-20 PROCEDURE — 77067 SCR MAMMO BI INCL CAD: CPT

## 2020-12-01 RX ORDER — PIOGLITAZONEHYDROCHLORIDE 15 MG/1
TABLET ORAL
Qty: 30 TABLET | Refills: 2 | Status: SHIPPED | OUTPATIENT
Start: 2020-12-01 | End: 2021-02-08

## 2020-12-06 DIAGNOSIS — E78.5 HYPERLIPIDEMIA, UNSPECIFIED HYPERLIPIDEMIA TYPE: ICD-10-CM

## 2020-12-08 RX ORDER — ATORVASTATIN CALCIUM 20 MG/1
TABLET, FILM COATED ORAL
Qty: 30 TABLET | Refills: 2 | Status: SHIPPED | OUTPATIENT
Start: 2020-12-08 | End: 2021-03-04

## 2020-12-28 DIAGNOSIS — I10 ESSENTIAL HYPERTENSION: ICD-10-CM

## 2020-12-29 ENCOUNTER — TELEPHONE (OUTPATIENT)
Dept: FAMILY MEDICINE CLINIC | Facility: CLINIC | Age: 55
End: 2020-12-29

## 2020-12-29 RX ORDER — OLMESARTAN MEDOXOMIL 40 MG/1
TABLET ORAL
Qty: 30 TABLET | Refills: 0 | Status: SHIPPED | OUTPATIENT
Start: 2020-12-29 | End: 2021-01-27

## 2020-12-31 DIAGNOSIS — M25.511 ACUTE PAIN OF RIGHT SHOULDER: ICD-10-CM

## 2020-12-31 RX ORDER — CYCLOBENZAPRINE HCL 10 MG
TABLET ORAL
Qty: 30 TABLET | Refills: 0 | Status: SHIPPED | OUTPATIENT
Start: 2020-12-31 | End: 2021-03-16

## 2021-01-26 DIAGNOSIS — I10 ESSENTIAL HYPERTENSION: ICD-10-CM

## 2021-01-27 RX ORDER — OLMESARTAN MEDOXOMIL 40 MG/1
TABLET ORAL
Qty: 30 TABLET | Refills: 2 | Status: SHIPPED | OUTPATIENT
Start: 2021-01-27 | End: 2021-04-28

## 2021-02-04 DIAGNOSIS — G25.81 RESTLESS LEG SYNDROME: ICD-10-CM

## 2021-02-04 RX ORDER — PRAMIPEXOLE DIHYDROCHLORIDE 0.5 MG/1
TABLET ORAL
Qty: 30 TABLET | Refills: 2 | Status: SHIPPED | OUTPATIENT
Start: 2021-02-04 | End: 2021-03-17 | Stop reason: SDUPTHER

## 2021-02-06 DIAGNOSIS — G47.00 INSOMNIA, UNSPECIFIED TYPE: ICD-10-CM

## 2021-02-08 RX ORDER — QUETIAPINE FUMARATE 25 MG/1
TABLET, FILM COATED ORAL
Qty: 30 TABLET | Refills: 1 | Status: SHIPPED | OUTPATIENT
Start: 2021-02-08 | End: 2021-03-17 | Stop reason: SDUPTHER

## 2021-02-08 RX ORDER — MELOXICAM 15 MG/1
TABLET ORAL
COMMUNITY
Start: 2020-12-28 | End: 2021-05-07

## 2021-02-08 RX ORDER — PIOGLITAZONEHYDROCHLORIDE 15 MG/1
TABLET ORAL
Qty: 30 TABLET | Refills: 1 | Status: SHIPPED | OUTPATIENT
Start: 2021-02-08 | End: 2021-03-29 | Stop reason: SDUPTHER

## 2021-02-08 NOTE — TELEPHONE ENCOUNTER
LOV - 07/28/20    LAST FILLED - 12/01/20    LAST A1C 6.6% ON 10/16/20      NEXT APPOINTMENT - 04/15/21

## 2021-03-04 DIAGNOSIS — E78.5 HYPERLIPIDEMIA, UNSPECIFIED HYPERLIPIDEMIA TYPE: ICD-10-CM

## 2021-03-04 RX ORDER — ATORVASTATIN CALCIUM 20 MG/1
TABLET, FILM COATED ORAL
Qty: 30 TABLET | Refills: 1 | Status: SHIPPED | OUTPATIENT
Start: 2021-03-04 | End: 2021-05-07 | Stop reason: SDUPTHER

## 2021-03-13 DIAGNOSIS — M25.511 ACUTE PAIN OF RIGHT SHOULDER: ICD-10-CM

## 2021-03-16 ENCOUNTER — PATIENT MESSAGE (OUTPATIENT)
Dept: FAMILY MEDICINE CLINIC | Facility: CLINIC | Age: 56
End: 2021-03-16

## 2021-03-16 DIAGNOSIS — G47.00 INSOMNIA, UNSPECIFIED TYPE: ICD-10-CM

## 2021-03-16 RX ORDER — CYCLOBENZAPRINE HCL 10 MG
TABLET ORAL
Qty: 30 TABLET | Refills: 0 | Status: SHIPPED | OUTPATIENT
Start: 2021-03-16 | End: 2021-04-28

## 2021-03-17 DIAGNOSIS — G25.81 RESTLESS LEG SYNDROME: ICD-10-CM

## 2021-03-17 RX ORDER — QUETIAPINE FUMARATE 50 MG/1
50 TABLET, FILM COATED ORAL NIGHTLY
Qty: 30 TABLET | Refills: 1 | Status: SHIPPED | OUTPATIENT
Start: 2021-03-17 | End: 2021-05-07 | Stop reason: SDUPTHER

## 2021-03-17 RX ORDER — PRAMIPEXOLE DIHYDROCHLORIDE 0.12 MG/1
0.12 TABLET ORAL NIGHTLY
Qty: 7 TABLET | Refills: 0 | Status: SHIPPED | OUTPATIENT
Start: 2021-03-17 | End: 2021-04-13

## 2021-03-17 NOTE — TELEPHONE ENCOUNTER
Charu Kincaid MA 3/17/2021 12:07 PM EDT      ----- Message -----  From: Erin Bailey  Sent: 3/17/2021 11:55 AM EDT  To: [facility] [place] [facility]  Subject: RE: Prescription Question     Will you send in script for uped dosage of Seriquel as well? Thank you.     ----- Message -----  From: ETHEL Madrigal  Sent: 3/17/21 8:46 AM  To: Erin Bailey  Subject: RE: Prescription Question    We can definitely switch medications for restless leg. We will need to taper off pramipexole first. So please take half of the current pramipexole 0.5 mg tablet nightly for 1 week. Then you will need to take pramipexole 0.125 mg tablet nightly for 1 week which I will prescribe. After you have tapered off pramipexole I will send ropinirole 0.25 mg. You may increase Seroquel 25 mg to 2 tablets nightly at this time.       ----- Message -----   From:Erin Bailey   Sent:3/16/2021 3:05 PM EDT   To:ETHEL Madrigal   Subject:RE: Prescription Question    So definitely a leg movement thing.. And possibly getting immune to the seroquel dosage. Could we try switching the restless leg med and go from there?. Don't know if you think we should up seroquel dosage as well. Sleep has been a real problem for the last few weeks. Part is due to new job. But legs are a real problem at the moment.       ----- Message -----   From:ETHEL Madrigal   Sent:3/16/2021 1:04 PM EDT   To:Erin Bailey   Subject:RE: Prescription Question    I am unable to increase pramipexole because it is at the maximum dose. So would recommend switching to ropinirole or increasing Seroquel for insomnia.       ----- Message -----   From:Erin Bailey   Sent:3/16/2021 10:06 AM EDT   To:ETHEL Madrigal   Subject:Prescription Question    Can we up the restless leg med any more? I find I take one at bedtime, and wake up a few hours later needing a second one. Then can sleep through the night. Thanks. Erin

## 2021-03-24 ENCOUNTER — BULK ORDERING (OUTPATIENT)
Dept: CASE MANAGEMENT | Facility: OTHER | Age: 56
End: 2021-03-24

## 2021-03-24 DIAGNOSIS — Z23 IMMUNIZATION DUE: ICD-10-CM

## 2021-03-30 RX ORDER — PIOGLITAZONEHYDROCHLORIDE 15 MG/1
15 TABLET ORAL DAILY
Qty: 30 TABLET | Refills: 0 | Status: SHIPPED | OUTPATIENT
Start: 2021-03-30 | End: 2021-05-07 | Stop reason: SDUPTHER

## 2021-04-11 ENCOUNTER — PATIENT MESSAGE (OUTPATIENT)
Dept: FAMILY MEDICINE CLINIC | Facility: CLINIC | Age: 56
End: 2021-04-11

## 2021-04-13 RX ORDER — ROPINIROLE 0.25 MG/1
0.25 TABLET, FILM COATED ORAL NIGHTLY
Qty: 30 TABLET | Refills: 0 | Status: SHIPPED | OUTPATIENT
Start: 2021-04-13 | End: 2021-05-07 | Stop reason: SDUPTHER

## 2021-04-13 NOTE — TELEPHONE ENCOUNTER
Mague Castillo MA 4/12/2021 8:15 AM EDT      ----- Message -----  From: Erin Bailey  Sent: 4/11/2021 11:41 PM EDT  To: [facility]  Subject: Prescription Question     We were going to swirch me to ropinirole 0.25 MG. For my restless legs, but the pharmacy hasn't gotten the new script.. I already did the weening off my other med, I just cut a .5 pramipexol into quarter size to have something tonight, so is the same dose as you perscribed last. Thanks. Erin

## 2021-04-27 DIAGNOSIS — I10 ESSENTIAL HYPERTENSION: ICD-10-CM

## 2021-04-27 DIAGNOSIS — M25.511 ACUTE PAIN OF RIGHT SHOULDER: ICD-10-CM

## 2021-04-28 RX ORDER — OLMESARTAN MEDOXOMIL 40 MG/1
TABLET ORAL
Qty: 30 TABLET | Refills: 0 | Status: SHIPPED | OUTPATIENT
Start: 2021-04-28 | End: 2021-05-07 | Stop reason: SDUPTHER

## 2021-04-28 RX ORDER — CYCLOBENZAPRINE HCL 10 MG
TABLET ORAL
Qty: 30 TABLET | Refills: 0 | Status: SHIPPED | OUTPATIENT
Start: 2021-04-28 | End: 2021-05-07 | Stop reason: SDUPTHER

## 2021-05-06 ENCOUNTER — OFFICE VISIT (OUTPATIENT)
Dept: FAMILY MEDICINE CLINIC | Facility: CLINIC | Age: 56
End: 2021-05-06

## 2021-05-06 VITALS
OXYGEN SATURATION: 99 % | HEIGHT: 66 IN | TEMPERATURE: 97.7 F | WEIGHT: 293 LBS | BODY MASS INDEX: 47.09 KG/M2 | HEART RATE: 98 BPM | SYSTOLIC BLOOD PRESSURE: 125 MMHG | DIASTOLIC BLOOD PRESSURE: 87 MMHG

## 2021-05-06 DIAGNOSIS — E11.9 TYPE 2 DIABETES MELLITUS WITHOUT COMPLICATION, WITHOUT LONG-TERM CURRENT USE OF INSULIN (HCC): ICD-10-CM

## 2021-05-06 DIAGNOSIS — E78.5 HYPERLIPIDEMIA, UNSPECIFIED HYPERLIPIDEMIA TYPE: ICD-10-CM

## 2021-05-06 DIAGNOSIS — M25.562 CHRONIC PAIN OF LEFT KNEE: ICD-10-CM

## 2021-05-06 DIAGNOSIS — Z00.00 WELL FEMALE EXAM WITHOUT GYNECOLOGICAL EXAM: Primary | ICD-10-CM

## 2021-05-06 DIAGNOSIS — Z23 IMMUNIZATION DUE: ICD-10-CM

## 2021-05-06 DIAGNOSIS — S99.922A INJURY OF TOENAIL OF LEFT FOOT, INITIAL ENCOUNTER: ICD-10-CM

## 2021-05-06 DIAGNOSIS — H10.12 ALLERGIC CONJUNCTIVITIS OF LEFT EYE: ICD-10-CM

## 2021-05-06 DIAGNOSIS — G89.29 CHRONIC PAIN OF LEFT KNEE: ICD-10-CM

## 2021-05-06 PROCEDURE — 90750 HZV VACC RECOMBINANT IM: CPT | Performed by: NURSE PRACTITIONER

## 2021-05-06 PROCEDURE — 90471 IMMUNIZATION ADMIN: CPT | Performed by: NURSE PRACTITIONER

## 2021-05-06 PROCEDURE — 99396 PREV VISIT EST AGE 40-64: CPT | Performed by: NURSE PRACTITIONER

## 2021-05-06 RX ORDER — OLOPATADINE HYDROCHLORIDE 2 MG/ML
1 SOLUTION/ DROPS OPHTHALMIC DAILY
Qty: 2.5 ML | Refills: 0 | Status: SHIPPED | OUTPATIENT
Start: 2021-05-06 | End: 2022-12-16 | Stop reason: ALTCHOICE

## 2021-05-06 NOTE — PROGRESS NOTES
Subjective   Erin Bailey is a 55 y.o. female.     Chief Complaint   Patient presents with   • Annual Exam   • Knee Pain       Knee Pain   The injury mechanism is unknown. The pain is present in the left knee. The quality of the pain is described as aching. The pain is at a severity of 7/10. Pertinent negatives include no inability to bear weight, muscle weakness or numbness. The symptoms are aggravated by movement. She has tried NSAIDs for the symptoms. The treatment provided mild relief.      The patient is being seen for a health maintenance evaluation.  The last health maintenance visit is unknown.  Social history: Household members include none.  She is .  Work status: Full-time.  The patient has never smoked cigarettes.  She reports occasional alcohol use.  General health: The patient's health since the last visit is described as good.  She does not have regular dental visits.  The patient brushes daily, flosses daily and reports her last dental visit is unknown.  She denies vision problems.  Vision care includes glasses and an eye exam within the past year.  She denies hearing loss.  Immunization status: Shingrix vaccination needed.  Lifestyle: She does not exercise regularly.  Reproductive health: She is not sexually active.  Screening: A normal Pap smear was performed on 10/6/2020.  A normal mammogram was performed on 11/20/2020.    The following portions of the patient's history were reviewed and updated as appropriate: allergies, current medications, past family history, past medical history, past social history, past surgical history and problem list.    Past Medical History:   Diagnosis Date   • Allergic    • Diabetes mellitus (CMS/HCC)    • Hyperlipemia    • Hypertension    • Insomnia    • Knee injury     left   • Migraine    • Restless leg        Past Surgical History:   Procedure Laterality Date   • BREAST BIOPSY Bilateral 2015    with excision/ atypical cells  bilateral   • BREAST  "EXCISIONAL BIOPSY Bilateral 2015    \"\"\"\" after bx infection left breast (open wound care)   • BREAST SURGERY     • CHOLECYSTECTOMY     • CHOLECYSTECTOMY     • HERNIA REPAIR     • HERNIA REPAIR     • TONSILLECTOMY         Family History   Problem Relation Age of Onset   • Prostate cancer Father    • Colon cancer Sister    • Diabetes Maternal Grandmother        Social History     Socioeconomic History   • Marital status:      Spouse name: Not on file   • Number of children: Not on file   • Years of education: Not on file   • Highest education level: Not on file   Tobacco Use   • Smoking status: Never Smoker   • Smokeless tobacco: Never Used   Substance and Sexual Activity   • Alcohol use: Yes     Comment: socially    • Drug use: Never   • Sexual activity: Not Currently     Partners: Male       Review of Systems   Constitutional: Negative for fever.   HENT: Negative for ear pain, rhinorrhea and sore throat.    Eyes: Positive for redness and itching.   Respiratory: Negative for cough and shortness of breath.    Cardiovascular: Negative for chest pain.   Gastrointestinal: Negative for abdominal pain, diarrhea, nausea and vomiting.   Genitourinary: Negative.    Musculoskeletal:        Left knee pain   Skin: Negative for rash.   Neurological: Negative for numbness.   Psychiatric/Behavioral: Negative for depressed mood.       Objective   Vitals:    05/06/21 1415   BP: 125/87   Pulse: 98   Temp: 97.7 °F (36.5 °C)   TempSrc: Temporal   SpO2: 99%   Weight: (!) 143 kg (315 lb 6.4 oz)   Height: 167.6 cm (66\")      Body mass index is 50.91 kg/m².  Physical Exam  Vitals and nursing note reviewed.   Constitutional:       Appearance: Normal appearance.   HENT:      Head: Normocephalic and atraumatic.      Right Ear: Tympanic membrane and ear canal normal.      Left Ear: Tympanic membrane and ear canal normal.   Eyes:      Conjunctiva/sclera:      Left eye: Left conjunctiva is injected.      Pupils: Pupils are equal, round, " and reactive to light.   Cardiovascular:      Rate and Rhythm: Normal rate and regular rhythm.      Heart sounds: Normal heart sounds.   Pulmonary:      Effort: Pulmonary effort is normal.      Breath sounds: Normal breath sounds.   Abdominal:      General: Bowel sounds are normal.      Palpations: Abdomen is soft.      Tenderness: There is no abdominal tenderness.   Genitourinary:     Comments: Deferred   Musculoskeletal:         General: Normal range of motion.   Skin:     General: Skin is warm and dry.   Neurological:      Mental Status: She is alert and oriented to person, place, and time.   Psychiatric:         Mood and Affect: Mood normal.           Assessment/Plan   Diagnoses and all orders for this visit:    1. Well female exam without gynecological exam (Primary)  -     Shingrix Vaccine    2. Type 2 diabetes mellitus without complication, without long-term current use of insulin (CMS/[facility])  -     Hemoglobin A1c  -     Comprehensive Metabolic Panel    3. Hyperlipidemia, unspecified hyperlipidemia type  -     Comprehensive Metabolic Panel  -     Lipid Panel With / Chol / HDL Ratio    4. Chronic pain of left knee  -     XR Knee 1 or 2 View Left; Future    5. Injury of toenail of left foot, initial encounter  -     Ambulatory Referral to Podiatry    6. Immunization due    7. Allergic conjunctivitis of left eye  -     olopatadine (PATADAY) 0.2 % solution ophthalmic solution; Administer 1 drop into the left eye Daily.  Dispense: 2.5 mL; Refill: 0    Impression: Currently, she has an inadequate exercise regimen.  Cervical cancer screening is current.  Breast cancer screening is current.  Lab work includes A1c, CMP and lipid panel.  Shingrix vaccination given today.  Advice and education were given regarding aerobic exercise.

## 2021-05-07 ENCOUNTER — TELEPHONE (OUTPATIENT)
Dept: FAMILY MEDICINE CLINIC | Facility: CLINIC | Age: 56
End: 2021-05-07

## 2021-05-07 DIAGNOSIS — M25.511 ACUTE PAIN OF RIGHT SHOULDER: ICD-10-CM

## 2021-05-07 DIAGNOSIS — E78.5 HYPERLIPIDEMIA, UNSPECIFIED HYPERLIPIDEMIA TYPE: ICD-10-CM

## 2021-05-07 DIAGNOSIS — E87.5 HYPERKALEMIA: Primary | ICD-10-CM

## 2021-05-07 DIAGNOSIS — G47.00 INSOMNIA, UNSPECIFIED TYPE: ICD-10-CM

## 2021-05-07 DIAGNOSIS — I10 ESSENTIAL HYPERTENSION: ICD-10-CM

## 2021-05-07 LAB
ALBUMIN SERPL-MCNC: 4.2 G/DL (ref 3.5–5.2)
ALBUMIN/GLOB SERPL: 1.4 G/DL
ALP SERPL-CCNC: 101 U/L (ref 39–117)
ALT SERPL-CCNC: 14 U/L (ref 1–33)
AST SERPL-CCNC: 18 U/L (ref 1–32)
BILIRUB SERPL-MCNC: 0.3 MG/DL (ref 0–1.2)
BUN SERPL-MCNC: 16 MG/DL (ref 6–20)
BUN/CREAT SERPL: 14 (ref 7–25)
CALCIUM SERPL-MCNC: 10 MG/DL (ref 8.6–10.5)
CHLORIDE SERPL-SCNC: 103 MMOL/L (ref 98–107)
CHOLEST SERPL-MCNC: 165 MG/DL (ref 0–200)
CHOLEST/HDLC SERPL: 2.2 {RATIO}
CO2 SERPL-SCNC: 26.1 MMOL/L (ref 22–29)
CREAT SERPL-MCNC: 1.14 MG/DL (ref 0.57–1)
GLOBULIN SER CALC-MCNC: 2.9 GM/DL
GLUCOSE SERPL-MCNC: 103 MG/DL (ref 65–99)
HBA1C MFR BLD: 6.8 % (ref 4.8–5.6)
HDLC SERPL-MCNC: 75 MG/DL (ref 40–60)
LDLC SERPL CALC-MCNC: 62 MG/DL (ref 0–100)
POTASSIUM SERPL-SCNC: 5.4 MMOL/L (ref 3.5–5.2)
PROT SERPL-MCNC: 7.1 G/DL (ref 6–8.5)
SODIUM SERPL-SCNC: 141 MMOL/L (ref 136–145)
TRIGL SERPL-MCNC: 172 MG/DL (ref 0–150)
VLDLC SERPL CALC-MCNC: 28 MG/DL (ref 5–40)

## 2021-05-07 RX ORDER — ROPINIROLE 0.25 MG/1
0.25 TABLET, FILM COATED ORAL NIGHTLY
Qty: 90 TABLET | Refills: 1 | Status: SHIPPED | OUTPATIENT
Start: 2021-05-07 | End: 2021-07-27 | Stop reason: SDUPTHER

## 2021-05-07 RX ORDER — CYCLOBENZAPRINE HCL 10 MG
10 TABLET ORAL NIGHTLY PRN
Qty: 90 TABLET | Refills: 1 | Status: SHIPPED | OUTPATIENT
Start: 2021-05-07 | End: 2021-11-05 | Stop reason: SDUPTHER

## 2021-05-07 RX ORDER — OLMESARTAN MEDOXOMIL 40 MG/1
40 TABLET ORAL DAILY
Qty: 90 TABLET | Refills: 1 | Status: SHIPPED | OUTPATIENT
Start: 2021-05-07 | End: 2021-09-07 | Stop reason: SDUPTHER

## 2021-05-07 RX ORDER — QUETIAPINE FUMARATE 50 MG/1
50 TABLET, FILM COATED ORAL NIGHTLY
Qty: 90 TABLET | Refills: 1 | Status: SHIPPED | OUTPATIENT
Start: 2021-05-07 | End: 2021-11-05 | Stop reason: SDUPTHER

## 2021-05-07 RX ORDER — PIOGLITAZONEHYDROCHLORIDE 15 MG/1
15 TABLET ORAL DAILY
Qty: 90 TABLET | Refills: 1 | Status: SHIPPED | OUTPATIENT
Start: 2021-05-07 | End: 2021-11-30

## 2021-05-07 RX ORDER — ATORVASTATIN CALCIUM 20 MG/1
TABLET, FILM COATED ORAL
Qty: 30 TABLET | Refills: 0 | OUTPATIENT
Start: 2021-05-07

## 2021-05-07 RX ORDER — ATORVASTATIN CALCIUM 20 MG/1
20 TABLET, FILM COATED ORAL NIGHTLY
Qty: 90 TABLET | Refills: 3 | Status: SHIPPED | OUTPATIENT
Start: 2021-05-07 | End: 2022-04-29

## 2021-05-07 NOTE — TELEPHONE ENCOUNTER
Diabetes remains well controlled with A1c of 6.8% so continue current treatment regimen.  Creatinine remains elevated with GFR being consistent with CKD stage 3a so would recommend discontinuation of meloxicam.  Potassium is slightly elevated at 5.4 so would recommend decreasing potassium rich foods and discontinuing any potassium supplementation with repeat potassium level in 2 weeks.  Liver function tests are normal.  Cholesterol panel has improved so continue current treatment regimen.    Patient aware of results and recommendations.

## 2021-05-12 ENCOUNTER — HOSPITAL ENCOUNTER (OUTPATIENT)
Dept: GENERAL RADIOLOGY | Facility: HOSPITAL | Age: 56
Discharge: HOME | End: 2021-05-12
Admitting: NURSE PRACTITIONER

## 2021-05-12 DIAGNOSIS — G89.29 CHRONIC PAIN OF LEFT KNEE: ICD-10-CM

## 2021-05-12 DIAGNOSIS — M25.562 CHRONIC PAIN OF LEFT KNEE: ICD-10-CM

## 2021-05-12 PROCEDURE — 73560 X-RAY EXAM OF KNEE 1 OR 2: CPT

## 2021-05-13 ENCOUNTER — TELEPHONE (OUTPATIENT)
Dept: FAMILY MEDICINE CLINIC | Facility: CLINIC | Age: 56
End: 2021-05-13

## 2021-05-13 DIAGNOSIS — G89.29 CHRONIC PAIN OF LEFT KNEE: Primary | ICD-10-CM

## 2021-05-13 DIAGNOSIS — M25.562 CHRONIC PAIN OF LEFT KNEE: Primary | ICD-10-CM

## 2021-05-14 ENCOUNTER — TELEPHONE (OUTPATIENT)
Dept: FAMILY MEDICINE CLINIC | Facility: CLINIC | Age: 56
End: 2021-05-14

## 2021-05-18 NOTE — TELEPHONE ENCOUNTER
Pt called, read message to her.  She would like to precede with the ortho referral. Asked if she like the provider she saw in November... she said she believed he was only shoulders.  She really would like to see someone who has Saturdays available.

## 2021-06-07 ENCOUNTER — OFFICE VISIT (OUTPATIENT)
Dept: ORTHOPEDIC SURGERY | Facility: CLINIC | Age: 56
End: 2021-06-07

## 2021-06-07 VITALS — HEIGHT: 66 IN | TEMPERATURE: 97.1 F | BODY MASS INDEX: 47.09 KG/M2 | WEIGHT: 293 LBS

## 2021-06-07 DIAGNOSIS — M17.0 PRIMARY OSTEOARTHRITIS OF BOTH KNEES: Primary | ICD-10-CM

## 2021-06-07 DIAGNOSIS — E66.01 OBESITY, MORBID, BMI 40.0-49.9 (HCC): ICD-10-CM

## 2021-06-07 DIAGNOSIS — M17.12 ARTHRITIS OF LEFT KNEE: ICD-10-CM

## 2021-06-07 PROCEDURE — 99204 OFFICE O/P NEW MOD 45 MIN: CPT | Performed by: ORTHOPAEDIC SURGERY

## 2021-06-07 PROCEDURE — 20610 DRAIN/INJ JOINT/BURSA W/O US: CPT | Performed by: ORTHOPAEDIC SURGERY

## 2021-06-07 RX ORDER — METHYLPREDNISOLONE ACETATE 80 MG/ML
80 INJECTION, SUSPENSION INTRA-ARTICULAR; INTRALESIONAL; INTRAMUSCULAR; SOFT TISSUE
Status: COMPLETED | OUTPATIENT
Start: 2021-06-07 | End: 2021-06-07

## 2021-06-07 RX ADMIN — METHYLPREDNISOLONE ACETATE 80 MG: 80 INJECTION, SUSPENSION INTRA-ARTICULAR; INTRALESIONAL; INTRAMUSCULAR; SOFT TISSUE at 10:01

## 2021-06-07 NOTE — PATIENT INSTRUCTIONS
"Journal for Nurse Practitioners, 15(4), 263-267. Retrieved October 7, 2019 from http://clinicalkey.com/nursing\">   Knee Exercises  Ask your health care provider which exercises are safe for you. Do exercises exactly as told by your health care provider and adjust them as directed. It is normal to feel mild stretching, pulling, tightness, or discomfort as you do these exercises. Stop right away if you feel sudden pain or your pain gets worse. Do not begin these exercises until told by your health care provider.  Stretching and range-of-motion exercises  These exercises warm up your muscles and joints and improve the movement and flexibility of your knee. These exercises also help to relieve pain and swelling.  Knee extension, prone  1. Lie on your abdomen (prone position) on a bed.  2. Place your left / right knee just beyond the edge of the surface so your knee is not on the bed. You can put a towel under your left / right thigh just above your kneecap for comfort.  3. Relax your leg muscles and allow gravity to straighten your knee (extension). You should feel a stretch behind your left / right knee.  4. Hold this position for __________ seconds.  5. Scoot up so your knee is supported between repetitions.  Repeat __________ times. Complete this exercise __________ times a day.  Knee flexion, active    1. Lie on your back with both legs straight. If this causes back discomfort, bend your left / right knee so your foot is flat on the floor.  2. Slowly slide your left / right heel back toward your buttocks. Stop when you feel a gentle stretch in the front of your knee or thigh (flexion).  3. Hold this position for __________ seconds.  4. Slowly slide your left / right heel back to the starting position.  Repeat __________ times. Complete this exercise __________ times a day.  Quadriceps stretch, prone    1. Lie on your abdomen on a firm surface, such as a bed or padded floor.  2. Bend your left / right knee and hold " your ankle. If you cannot reach your ankle or pant leg, loop a belt around your foot and grab the belt instead.  3. Gently pull your heel toward your buttocks. Your knee should not slide out to the side. You should feel a stretch in the front of your thigh and knee (quadriceps).  4. Hold this position for __________ seconds.  Repeat __________ times. Complete this exercise __________ times a day.  Hamstring, supine  1. Lie on your back (supine position).  2. Loop a belt or towel over the ball of your left / right foot. The ball of your foot is on the walking surface, right under your toes.  3. Straighten your left / right knee and slowly pull on the belt to raise your leg until you feel a gentle stretch behind your knee (hamstring).  ? Do not let your knee bend while you do this.  ? Keep your other leg flat on the floor.  4. Hold this position for __________ seconds.  Repeat __________ times. Complete this exercise __________ times a day.  Strengthening exercises  These exercises build strength and endurance in your knee. Endurance is the ability to use your muscles for a long time, even after they get tired.  Quadriceps, isometric  This exercise stretches the muscles in front of your thigh (quadriceps) without moving your knee joint (isometric).  1. Lie on your back with your left / right leg extended and your other knee bent. Put a rolled towel or small pillow under your knee if told by your health care provider.  2. Slowly tense the muscles in the front of your left / right thigh. You should see your kneecap slide up toward your hip or see increased dimpling just above the knee. This motion will push the back of the knee toward the floor.  3. For __________ seconds, hold the muscle as tight as you can without increasing your pain.  4. Relax the muscles slowly and completely.  Repeat __________ times. Complete this exercise __________ times a day.  Straight leg raises  This exercise stretches the muscles in front  "of your thigh (quadriceps) and the muscles that move your hips (hip flexors).  1. Lie on your back with your left / right leg extended and your other knee bent.  2. Tense the muscles in the front of your left / right thigh. You should see your kneecap slide up or see increased dimpling just above the knee. Your thigh may even shake a bit.  3. Keep these muscles tight as you raise your leg 4-6 inches (10-15 cm) off the floor. Do not let your knee bend.  4. Hold this position for __________ seconds.  5. Keep these muscles tense as you lower your leg.  6. Relax your muscles slowly and completely after each repetition.  Repeat __________ times. Complete this exercise __________ times a day.  Hamstring, isometric  1. Lie on your back on a firm surface.  2. Bend your left / right knee about __________ degrees.  3. Dig your left / right heel into the surface as if you are trying to pull it toward your buttocks. Tighten the muscles in the back of your thighs (hamstring) to \"dig\" as hard as you can without increasing any pain.  4. Hold this position for __________ seconds.  5. Release the tension gradually and allow your muscles to relax completely for __________ seconds after each repetition.  Repeat __________ times. Complete this exercise __________ times a day.  Hamstring curls  If told by your health care provider, do this exercise while wearing ankle weights. Begin with __________ lb weights. Then increase the weight by 1 lb (0.5 kg) increments. Do not wear ankle weights that are more than __________ lb.  1. Lie on your abdomen with your legs straight.  2. Bend your left / right knee as far as you can without feeling pain. Keep your hips flat against the floor.  3. Hold this position for __________ seconds.  4. Slowly lower your leg to the starting position.  Repeat __________ times. Complete this exercise __________ times a day.  Squats  This exercise strengthens the muscles in front of your thigh and knee " (quadriceps).  1.  front of a table, with your feet and knees pointing straight ahead. You may rest your hands on the table for balance but not for support.  2. Slowly bend your knees and lower your hips like you are going to sit in a chair.  ? Keep your weight over your heels, not over your toes.  ? Keep your lower legs upright so they are parallel with the table legs.  ? Do not let your hips go lower than your knees.  ? Do not bend lower than told by your health care provider.  ? If your knee pain increases, do not bend as low.  3. Hold the squat position for __________ seconds.  4. Slowly push with your legs to return to standing. Do not use your hands to pull yourself to standing.  Repeat __________ times. Complete this exercise __________ times a day.  Wall slides  This exercise strengthens the muscles in front of your thigh and knee (quadriceps).  1. Lean your back against a smooth wall or door, and walk your feet out 18-24 inches (46-61 cm) from it.  2. Place your feet hip-width apart.  3. Slowly slide down the wall or door until your knees bend __________ degrees. Keep your knees over your heels, not over your toes. Keep your knees in line with your hips.  4. Hold this position for __________ seconds.  Repeat __________ times. Complete this exercise __________ times a day.  Straight leg raises  This exercise strengthens the muscles that rotate the leg at the hip and move it away from your body (hip abductors).  1. Lie on your side with your left / right leg in the top position. Lie so your head, shoulder, knee, and hip line up. You may bend your bottom knee to help you keep your balance.  2. Roll your hips slightly forward so your hips are stacked directly over each other and your left / right knee is facing forward.  3. Leading with your heel, lift your top leg 4-6 inches (10-15 cm). You should feel the muscles in your outer hip lifting.  ? Do not let your foot drift forward.  ? Do not let your knee  roll toward the ceiling.  4. Hold this position for __________ seconds.  5. Slowly return your leg to the starting position.  6. Let your muscles relax completely after each repetition.  Repeat __________ times. Complete this exercise __________ times a day.  Straight leg raises  This exercise stretches the muscles that move your hips away from the front of the pelvis (hip extensors).  1. Lie on your abdomen on a firm surface. You can put a pillow under your hips if that is more comfortable.  2. Tense the muscles in your buttocks and lift your left / right leg about 4-6 inches (10-15 cm). Keep your knee straight as you lift your leg.  3. Hold this position for __________ seconds.  4. Slowly lower your leg to the starting position.  5. Let your leg relax completely after each repetition.  Repeat __________ times. Complete this exercise __________ times a day.  This information is not intended to replace advice given to you by your health care provider. Make sure you discuss any questions you have with your health care provider.  Document Revised: 10/08/2019 Document Reviewed: 10/08/2019  Elsevier Patient Education © 2021 [employer]

## 2021-06-07 NOTE — PROGRESS NOTES
"Patient Name: Erin Bailey   YOB: 1965  Referring Primary Care Physician: Essence Neal APRN  BMI: Body mass index is 49.87 kg/m².    Chief Complaint:    Chief Complaint   Patient presents with   • Left Knee - Edema, Pain, Initial Evaluation        HPI:     Erin Bailey is a 55 y.o. female who presents today for evaluation of   Chief Complaint   Patient presents with   • Left Knee - Edema, Pain, Initial Evaluation   . She reports she injured her knee at work in approximately 2016. The patient adds that at the time of the injury, it was handled by workers compensation and she attended physical therapy. She reports that her knee began to give way and she used a brace. The patient states \"it has never been the same since\". She reports it feels swollen, and trying to straighten the knee causes pain. The patient has not attended physical therapy for her knee since moving back to [place].       Subjective   Medications:   Home Medications:  Current Outpatient Medications on File Prior to Visit   Medication Sig   • atorvastatin (LIPITOR) 20 MG tablet Take 1 tablet by mouth Every Night.   • Cetirizine HCl (ZyrTEC Allergy) 10 MG capsule    • cyclobenzaprine (FLEXERIL) 10 MG tablet Take 1 tablet by mouth At Night As Needed for Muscle Spasms.   • fluticasone (FLONASE) 50 MCG/ACT nasal spray    • olmesartan (BENICAR) 40 MG tablet Take 1 tablet by mouth Daily.   • olopatadine (PATADAY) 0.2 % solution ophthalmic solution Administer 1 drop into the left eye Daily.   • Omega-3 Fatty Acids (fish oil) 1000 MG capsule capsule Take  by mouth Daily With Breakfast.   • pioglitazone (ACTOS) 15 MG tablet Take 1 tablet by mouth Daily.   • QUEtiapine (SEROquel) 50 MG tablet Take 1 tablet by mouth Every Night.   • rOPINIRole (Requip) 0.25 MG tablet Take 1 tablet by mouth Every Night. Take 1 hour before bedtime.   • SUMAtriptan (IMITREX) 50 MG tablet      No current facility-administered medications on file prior " "to visit.     Current Medications:  Scheduled Meds:  Continuous Infusions:No current facility-administered medications for this visit.    PRN Meds:.    I have reviewed the patient's medical history in detail and updated the computerized patient record.  Review and summarization of old records includes:    Past Medical History:   Diagnosis Date   • Allergic    • Diabetes mellitus (CMS/HCC)    • Hyperlipemia    • Hypertension    • Insomnia    • Knee injury     left   • Migraine    • Restless leg         Past Surgical History:   Procedure Laterality Date   • BREAST BIOPSY Bilateral 2015    with excision/ atypical cells  bilateral   • BREAST EXCISIONAL BIOPSY Bilateral 2015    \"\"\"\" after bx infection left breast (open wound care)   • BREAST SURGERY     • CHOLECYSTECTOMY     • CHOLECYSTECTOMY     • HERNIA REPAIR     • HERNIA REPAIR     • TONSILLECTOMY          Social History     Occupational History   • Not on file   Tobacco Use   • Smoking status: Never Smoker   • Smokeless tobacco: Never Used   Substance and Sexual Activity   • Alcohol use: Yes     Comment: socially    • Drug use: Never   • Sexual activity: Not Currently     Partners: Male      Social History     Social History Narrative   • Not on file        Family History   Problem Relation Age of Onset   • Prostate cancer Father    • Colon cancer Sister    • Diabetes Maternal Grandmother        ROS: 14 point review of systems was performed and all other systems were reviewed and are negative except for documented findings in HPI and today's encounter.     Allergies:   Allergies   Allergen Reactions   • Sulfa Antibiotics Hives and Itching     Constitutional:  Denies fever, shaking or chills   Eyes:  Denies change in visual acuity   HENT:  Denies nasal congestion or sore throat   Respiratory:  Denies cough or shortness of breath   Cardiovascular:  Denies chest pain or severe LE edema   GI:  Denies abdominal pain, nausea, vomiting, bloody stools or diarrhea " "  Musculoskeletal:  Numbness, tingling, pain, or loss of motor function only as noted above in history of present illness.  : Denies painful urination or hematuria  Integument:  Denies rash, lesion or ulceration   Neurologic:  Denies headache or focal weakness  Endocrine:  Denies lymphadenopathy  Psych:  Denies confusion or change in mental status   Hem:  Denies active bleeding    OBJECTIVE:  Physical Exam: 55 y.o. female  Wt Readings from Last 3 Encounters:   06/07/21 (!) 140 kg (309 lb)   05/06/21 (!) 143 kg (315 lb 6.4 oz)   11/13/20 135 kg (297 lb 9.6 oz)     Ht Readings from Last 1 Encounters:   06/07/21 167.6 cm (66\")     Body mass index is 49.87 kg/m².  Vitals:    06/07/21 0926   Temp: 97.1 °F (36.2 °C)     Vital signs reviewed.     General Appearance:    Alert, cooperative, in no acute distress                  Eyes: conjunctiva clear  ENT: external ears and nose atraumatic  CV: no peripheral edema  Resp: normal respiratory effort  Skin: no rashes or wounds; normal turgor  Psych: mood and affect appropriate  Lymph: no nodes appreciated  Neuro: gross sensation intact  Vascular:  Palpable peripheral pulse in noted extremity  Musculoskeletal Extremities: Medial and joint line tenderness. Cuellar's cyst is present. She goes from -10 degrees to approximately 95 degrees. Little pseudo laxity. Large, soft tissue envelope noted the knee with some stiffness and joint line tenderness I do feel a Baker's cyst.     Radiology:   Two views of the left knee, including AP and lateral views, were obtained from the hospital system on EPIC,  and reviewed in the office today taken presumably for pain. Without comparison views, these demonstrated arthritis relatively severe, the report says \"moderate\". I explained the differential with this to the patient.       Large Joint Arthrocentesis: L knee  Date/Time: 6/7/2021 10:01 AM  Consent given by: patient  Site marked: site marked  Timeout: Immediately prior to procedure a time out " was called to verify the correct patient, procedure, equipment, support staff and site/side marked as required   Supporting Documentation  Indications: pain and joint swelling   Procedure Details  Location: knee - L knee  Preparation: Patient was prepped and draped in the usual sterile fashion  Needle size: 22 G  Approach: anterolateral  Medications administered: 80 mg methylPREDNISolone acetate 80 MG/ML; 4 mL lidocaine (cardiac)  Patient tolerance: patient tolerated the procedure well with no immediate complications            Assessment:     ICD-10-CM ICD-9-CM   1. Primary osteoarthritis of both knees  M17.0 715.16   2. Obesity, morbid, BMI 40.0-49.9 (CMS/[facility])  E66.01 278.01        MDM/Plan:   The diagnosis(es), natural history, pathophysiology and treatment for diagnosis(es) were discussed. Opportunity given and questions answered.  Biomechanics of pertinent body areas discussed.  When appropriate, the use of ambulatory aids discussed.    I reviewed the x-rays with the patient today, as well as discussed the nature of her pain. She is encouraged to do exercises to help strengthen the knee. I will provide her with a document explaining the exercises that she can do at home. Ice, heat, and liniments can be used for inflammation control. She is advised if she feels the need for additional injections in the future, 3 months time has to pass in between injections.     She received a cortisone injection in her left knee today.     BMI:  The concept of BMI body mass index and its importance and implications discussed.    EXERCISES:  Advice on benefits of, and types of regular/moderate exercise pertaining to orthopedic diagnosis(es).  MEDICATIONS:  The risks, benefits, warnings,side effects and alternatives of medications discussed.  Inflammation/pain control; with cold, heat, elevation and/or liniments discussed as appropriate  Cortisone Injection. See procedure note.  MEDICAL RECORDS reviewed from other provider(s) for  past and current medical history pertinent to this complaint.    Scribed for Clement Ang MD by Onelia Damian.  06/07/21   12:27 EDT    I have personally performed the services described in this document as scribed by the above individual, and it is both accurate and complete.  Clement Ang MD  6/7/2021  14:48 EDT

## 2021-06-17 ENCOUNTER — TELEPHONE (OUTPATIENT)
Dept: ORTHOPEDIC SURGERY | Facility: CLINIC | Age: 56
End: 2021-06-17

## 2021-06-17 NOTE — TELEPHONE ENCOUNTER
Hub staff attempted to follow warm transfer process and was unsuccessful     Caller: OWEN TAYLOR    Relationship to patient: SELF    Best call back number: [phone]      Patient is needing: PATIENT IS HAVING SEVERE PAIN IN RIGHT KNEE- SCHEDULED AN APPT WITH DR EISENBERG FOR TOMORROW (NEW PROBLEM) PATIENT NEEDS ADVICE ON WHAT TO DO UNTIL APPT- -TRIED TO WARM TRANSFER AND WAS HUNG UP ON. PLEASE ADVISE PATIENT THANK YOU

## 2021-06-17 NOTE — TELEPHONE ENCOUNTER
Called and informed pt to rest and take tylenol. She states ibuprofen works better for her I told her that would be fine. Also told her she can try applying ice to the affected knee to see if that gives her any relief but not to apply ice or ice pack directly to the skin. She gave verbal understanding.

## 2021-06-18 ENCOUNTER — OFFICE VISIT (OUTPATIENT)
Dept: ORTHOPEDIC SURGERY | Facility: CLINIC | Age: 56
End: 2021-06-18

## 2021-06-18 ENCOUNTER — TELEPHONE (OUTPATIENT)
Dept: ORTHOPEDIC SURGERY | Facility: CLINIC | Age: 56
End: 2021-06-18

## 2021-06-18 VITALS — TEMPERATURE: 96.8 F | RESPIRATION RATE: 20 BRPM | HEIGHT: 67 IN | WEIGHT: 293 LBS | BODY MASS INDEX: 45.99 KG/M2

## 2021-06-18 DIAGNOSIS — R52 PAIN: Primary | ICD-10-CM

## 2021-06-18 PROCEDURE — 99213 OFFICE O/P EST LOW 20 MIN: CPT | Performed by: ORTHOPAEDIC SURGERY

## 2021-06-18 PROCEDURE — 73562 X-RAY EXAM OF KNEE 3: CPT | Performed by: ORTHOPAEDIC SURGERY

## 2021-06-18 NOTE — TELEPHONE ENCOUNTER
Patient states that her she felt her right knee pop and now she is having extreme pain on the back of her knee going down her calf along with ankle swelling. Please advise

## 2021-07-15 ENCOUNTER — OFFICE VISIT (OUTPATIENT)
Dept: FAMILY MEDICINE CLINIC | Facility: CLINIC | Age: 56
End: 2021-07-15

## 2021-07-15 VITALS
WEIGHT: 293 LBS | OXYGEN SATURATION: 98 % | TEMPERATURE: 98.7 F | HEIGHT: 67 IN | DIASTOLIC BLOOD PRESSURE: 60 MMHG | BODY MASS INDEX: 45.99 KG/M2 | SYSTOLIC BLOOD PRESSURE: 100 MMHG | HEART RATE: 96 BPM

## 2021-07-15 DIAGNOSIS — Z00.00 WELLNESS EXAMINATION: ICD-10-CM

## 2021-07-15 DIAGNOSIS — R21 RASH: Primary | ICD-10-CM

## 2021-07-15 PROCEDURE — 90471 IMMUNIZATION ADMIN: CPT | Performed by: FAMILY MEDICINE

## 2021-07-15 PROCEDURE — 90750 HZV VACC RECOMBINANT IM: CPT | Performed by: FAMILY MEDICINE

## 2021-07-15 PROCEDURE — 99213 OFFICE O/P EST LOW 20 MIN: CPT | Performed by: FAMILY MEDICINE

## 2021-07-15 RX ORDER — CEPHALEXIN 250 MG/1
250 CAPSULE ORAL 4 TIMES DAILY
Qty: 40 CAPSULE | Refills: 0 | Status: SHIPPED | OUTPATIENT
Start: 2021-07-15 | End: 2021-08-05

## 2021-07-15 RX ORDER — CEPHALEXIN 250 MG/1
250 CAPSULE ORAL 4 TIMES DAILY
Qty: 40 CAPSULE | Refills: 0 | Status: SHIPPED | OUTPATIENT
Start: 2021-07-15 | End: 2021-07-15

## 2021-07-15 NOTE — PROGRESS NOTES
"Chief Complaint  Rash (under right underarm and breast spreading to left side)    Subjective          Erin Bailey presents to [facility]  History of Present Illness  Mainly R under arm rash and under R breast and a little under L side DM under control, itchy a little, no pain  Objective   Vital Signs:   /60 (BP Location: Left arm, Patient Position: Sitting)   Pulse 96   Temp 98.7 °F (37.1 °C) (Temporal)   Ht 170.2 cm (67.01\")   Wt (!) 151 kg (333 lb 3.2 oz)   SpO2 98%   BMI 52.17 kg/m²     Physical Exam  Vitals and nursing note reviewed.   Constitutional:       Appearance: She is well-developed.   HENT:      Head: Normocephalic and atraumatic.      Right Ear: External ear normal.      Left Ear: External ear normal.      Nose: Nose normal.   Eyes:      General: No scleral icterus.        Right eye: No discharge.         Left eye: No discharge.      Conjunctiva/sclera: Conjunctivae normal.      Pupils: Pupils are equal, round, and reactive to light.   Neck:      Thyroid: No thyromegaly.      Vascular: No JVD.      Trachea: No tracheal deviation.   Cardiovascular:      Rate and Rhythm: Normal rate and regular rhythm.      Heart sounds: Normal heart sounds. No murmur heard.   No friction rub. No gallop.    Pulmonary:      Effort: Pulmonary effort is normal. No respiratory distress.      Breath sounds: Normal breath sounds. No wheezing or rales.   Chest:      Chest wall: No tenderness.   Abdominal:      General: Bowel sounds are normal. There is no distension.      Palpations: Abdomen is soft. There is no mass.      Tenderness: There is no abdominal tenderness. There is no guarding or rebound.      Hernia: No hernia is present.   Musculoskeletal:         General: No tenderness. Normal range of motion.      Cervical back: Normal range of motion and neck supple.   Lymphadenopathy:      Cervical: No cervical adenopathy.   Skin:     General: Skin is warm and dry.      " Findings: Erythema and rash present.      Comments: Nonconfluent erythema, covering entire axilla bilateral, and right side of the under breast area, nontender, each area is elliptical, around 1 cm in diameter each, does not have the appearance of shingles or heat rash   Neurological:      Mental Status: She is alert.      Cranial Nerves: No cranial nerve deficit.      Sensory: No sensory deficit.      Motor: No abnormal muscle tone.      Coordination: Coordination normal.      Deep Tendon Reflexes: Reflexes normal.   Psychiatric:         Mood and Affect: Mood normal.         Behavior: Behavior normal.         Thought Content: Thought content normal.         Judgment: Judgment normal.        Result Review :                 Assessment and Plan    Diagnoses and all orders for this visit:    1. Rash (Primary)  -     cephalexin (Keflex) 250 MG capsule; Take 1 capsule by mouth 4 (Four) Times a Day.  Dispense: 40 capsule; Refill: 0    2. Wellness examination  -     Shingrix Vaccine        Follow Up   Return in about 3 weeks (around 8/5/2021).  Patient was given instructions and counseling regarding her condition or for health maintenance advice. Please see specific information pulled into the AVS if appropriate.   CrCl 131  If no better after antibiotic therapy I will refer her  to a dermatologist, observe

## 2021-07-26 ENCOUNTER — PATIENT MESSAGE (OUTPATIENT)
Dept: FAMILY MEDICINE CLINIC | Facility: CLINIC | Age: 56
End: 2021-07-26

## 2021-07-27 RX ORDER — ROPINIROLE 0.5 MG/1
0.5 TABLET, FILM COATED ORAL NIGHTLY
Qty: 90 TABLET | Refills: 0 | Status: SHIPPED | OUTPATIENT
Start: 2021-07-27 | End: 2021-10-24

## 2021-08-05 ENCOUNTER — OFFICE VISIT (OUTPATIENT)
Dept: FAMILY MEDICINE CLINIC | Facility: CLINIC | Age: 56
End: 2021-08-05

## 2021-08-05 VITALS
SYSTOLIC BLOOD PRESSURE: 123 MMHG | WEIGHT: 293 LBS | OXYGEN SATURATION: 97 % | TEMPERATURE: 97.8 F | DIASTOLIC BLOOD PRESSURE: 85 MMHG | HEART RATE: 100 BPM | BODY MASS INDEX: 45.99 KG/M2 | HEIGHT: 67 IN

## 2021-08-05 DIAGNOSIS — B37.2 CANDIDAL INTERTRIGO: Primary | ICD-10-CM

## 2021-08-05 DIAGNOSIS — M25.561 CHRONIC PAIN OF BOTH KNEES: ICD-10-CM

## 2021-08-05 DIAGNOSIS — R79.89 ELEVATED SERUM CREATININE: ICD-10-CM

## 2021-08-05 DIAGNOSIS — G89.29 CHRONIC PAIN OF BOTH KNEES: ICD-10-CM

## 2021-08-05 DIAGNOSIS — M25.562 CHRONIC PAIN OF BOTH KNEES: ICD-10-CM

## 2021-08-05 DIAGNOSIS — E87.5 HYPERKALEMIA: ICD-10-CM

## 2021-08-05 PROCEDURE — 99213 OFFICE O/P EST LOW 20 MIN: CPT | Performed by: NURSE PRACTITIONER

## 2021-08-05 RX ORDER — NYSTATIN 100000 [USP'U]/G
POWDER TOPICAL 2 TIMES DAILY
Qty: 60 G | Refills: 2 | Status: SHIPPED | OUTPATIENT
Start: 2021-08-05

## 2021-08-06 LAB
BUN SERPL-MCNC: 18 MG/DL (ref 6–20)
BUN/CREAT SERPL: 15.3 (ref 7–25)
CALCIUM SERPL-MCNC: 10.2 MG/DL (ref 8.6–10.5)
CHLORIDE SERPL-SCNC: 102 MMOL/L (ref 98–107)
CO2 SERPL-SCNC: 28.6 MMOL/L (ref 22–29)
CREAT SERPL-MCNC: 1.18 MG/DL (ref 0.57–1)
GLUCOSE SERPL-MCNC: 118 MG/DL (ref 65–99)
POTASSIUM SERPL-SCNC: 4.8 MMOL/L (ref 3.5–5.2)
SODIUM SERPL-SCNC: 140 MMOL/L (ref 136–145)

## 2021-08-18 ENCOUNTER — TELEPHONE (OUTPATIENT)
Dept: FAMILY MEDICINE CLINIC | Facility: CLINIC | Age: 56
End: 2021-08-18

## 2021-09-07 DIAGNOSIS — I10 ESSENTIAL HYPERTENSION: ICD-10-CM

## 2021-09-07 RX ORDER — OLMESARTAN MEDOXOMIL 40 MG/1
40 TABLET ORAL DAILY
Qty: 90 TABLET | Refills: 0 | Status: SHIPPED | OUTPATIENT
Start: 2021-09-07 | End: 2021-11-30

## 2021-09-07 NOTE — TELEPHONE ENCOUNTER
LOV - 08/05/21    LAST FILLED - 05/07/21    NEXT APPOINTMENT - 11/05/21    PHARMACY ASKING FOR ADVANCE REFILL REQUEST

## 2021-09-15 ENCOUNTER — OFFICE VISIT (OUTPATIENT)
Dept: ORTHOPEDIC SURGERY | Facility: CLINIC | Age: 56
End: 2021-09-15

## 2021-09-15 VITALS — HEIGHT: 67 IN | TEMPERATURE: 97.8 F | BODY MASS INDEX: 45.99 KG/M2 | WEIGHT: 293 LBS

## 2021-09-15 DIAGNOSIS — M17.0 ARTHRITIS OF BOTH KNEES: Primary | ICD-10-CM

## 2021-09-15 PROCEDURE — 20610 DRAIN/INJ JOINT/BURSA W/O US: CPT | Performed by: ORTHOPAEDIC SURGERY

## 2021-09-15 RX ORDER — METHYLPREDNISOLONE ACETATE 80 MG/ML
80 INJECTION, SUSPENSION INTRA-ARTICULAR; INTRALESIONAL; INTRAMUSCULAR; SOFT TISSUE
Status: COMPLETED | OUTPATIENT
Start: 2021-09-15 | End: 2021-09-15

## 2021-09-15 RX ADMIN — METHYLPREDNISOLONE ACETATE 80 MG: 80 INJECTION, SUSPENSION INTRA-ARTICULAR; INTRALESIONAL; INTRAMUSCULAR; SOFT TISSUE at 10:24

## 2021-09-15 NOTE — PROGRESS NOTES
Patient follows up today bilateral knees.  She has some pain flares after previous injections.  Her BMI is 51.69.  Spoke to her about her weight and her BMI exam shows crepitation synovitis and swelling with stiffness plan is to inject bilateral knees which were performed*    Large Joint Arthrocentesis: R knee  Date/Time: 9/15/2021 10:24 AM  Consent given by: patient  Site marked: site marked  Timeout: Immediately prior to procedure a time out was called to verify the correct patient, procedure, equipment, support staff and site/side marked as required   Supporting Documentation  Indications: pain   Procedure Details  Location: knee - R knee  Preparation: Patient was prepped and draped in the usual sterile fashion  Needle gauge: 21 G.  Approach: anterolateral  Medications administered: 4 mL lidocaine (cardiac); 80 mg methylPREDNISolone acetate 80 MG/ML  Patient tolerance: patient tolerated the procedure well with no immediate complications    Large Joint Arthrocentesis: L knee  Date/Time: 9/15/2021 10:24 AM  Consent given by: patient  Site marked: site marked  Supporting Documentation  Indications: pain   Procedure Details  Location: knee - L knee  Preparation: Patient was prepped and draped in the usual sterile fashion  Needle gauge: 21 G.  Approach: anterolateral  Medications administered: 4 mL lidocaine (cardiac); 80 mg methylPREDNISolone acetate 80 MG/ML  Patient tolerance: patient tolerated the procedure well with no immediate complications

## 2021-10-24 RX ORDER — ROPINIROLE 0.5 MG/1
TABLET, FILM COATED ORAL
Qty: 90 TABLET | Refills: 0 | Status: SHIPPED | OUTPATIENT
Start: 2021-10-24 | End: 2021-11-15 | Stop reason: SDUPTHER

## 2021-10-24 NOTE — TELEPHONE ENCOUNTER
Rx Refill Note  Requested Prescriptions     Pending Prescriptions Disp Refills   • rOPINIRole (REQUIP) 0.5 MG tablet [Pharmacy Med Name: ROPINIROLE 0.5MG TABLETS] 90 tablet 0     Sig: TAKE 1 TABLET BY MOUTH EVERY NIGHT 1 HOUR BEFORE BEDTIME      Last office visit with prescribing clinician: 8/5/2021      Next office visit with prescribing clinician: 11/5/2021             Last filled 7/27/2021           Clau Clay MA  10/24/21, 15:48 EDT

## 2021-11-05 ENCOUNTER — OFFICE VISIT (OUTPATIENT)
Dept: FAMILY MEDICINE CLINIC | Facility: CLINIC | Age: 56
End: 2021-11-05

## 2021-11-05 VITALS
HEART RATE: 98 BPM | HEIGHT: 67 IN | OXYGEN SATURATION: 98 % | BODY MASS INDEX: 45.99 KG/M2 | TEMPERATURE: 98.5 F | WEIGHT: 293 LBS | SYSTOLIC BLOOD PRESSURE: 132 MMHG | DIASTOLIC BLOOD PRESSURE: 80 MMHG

## 2021-11-05 DIAGNOSIS — G89.29 CHRONIC PAIN OF BOTH KNEES: ICD-10-CM

## 2021-11-05 DIAGNOSIS — M25.561 CHRONIC PAIN OF BOTH KNEES: ICD-10-CM

## 2021-11-05 DIAGNOSIS — Z12.11 SCREENING FOR COLORECTAL CANCER: ICD-10-CM

## 2021-11-05 DIAGNOSIS — M25.511 ACUTE PAIN OF RIGHT SHOULDER: ICD-10-CM

## 2021-11-05 DIAGNOSIS — Z12.12 SCREENING FOR COLORECTAL CANCER: ICD-10-CM

## 2021-11-05 DIAGNOSIS — Z12.31 SCREENING MAMMOGRAM FOR BREAST CANCER: ICD-10-CM

## 2021-11-05 DIAGNOSIS — M25.562 CHRONIC PAIN OF BOTH KNEES: ICD-10-CM

## 2021-11-05 DIAGNOSIS — E11.9 TYPE 2 DIABETES MELLITUS WITHOUT COMPLICATION, WITHOUT LONG-TERM CURRENT USE OF INSULIN (HCC): Primary | ICD-10-CM

## 2021-11-05 DIAGNOSIS — G47.00 INSOMNIA, UNSPECIFIED TYPE: ICD-10-CM

## 2021-11-05 PROCEDURE — 99213 OFFICE O/P EST LOW 20 MIN: CPT | Performed by: NURSE PRACTITIONER

## 2021-11-05 RX ORDER — QUETIAPINE FUMARATE 100 MG/1
100 TABLET, FILM COATED ORAL NIGHTLY
Qty: 90 TABLET | Refills: 1 | Status: SHIPPED | OUTPATIENT
Start: 2021-11-05 | End: 2022-04-29

## 2021-11-05 RX ORDER — CYCLOBENZAPRINE HCL 10 MG
10 TABLET ORAL NIGHTLY PRN
Qty: 90 TABLET | Refills: 1 | Status: SHIPPED | OUTPATIENT
Start: 2021-11-05 | End: 2022-05-10 | Stop reason: SDUPTHER

## 2021-11-05 NOTE — PROGRESS NOTES
"Chief Complaint  Knee Pain    Subjective          Erin Bailey presents to [facility]  History of Present Illness  Diabetes Mellitus Type II, Follow-up: Patient here for follow-up of Type 2 diabetes mellitus. Symptoms: none. Home monitoring: The patient is not checking blood sugars at home. Medications: The patient is adherent with their medication regimen. Medication(s): pioglitazone. Due for: A1C and urine microalbumin.     Insomnia: Patient reports doing fairly well. Patient still complains of problems falling asleep and staying asleep. Current treatment includes Seroquel. Patient is adherent to treatment regimen.   Objective   Vital Signs:   /80 (BP Location: Left arm, Patient Position: Sitting)   Pulse 98   Temp 98.5 °F (36.9 °C)   Ht 170.2 cm (67.01\")   Wt (!) 151 kg (332 lb 3.2 oz)   SpO2 98%   BMI 52.02 kg/m²     Physical Exam  Vitals and nursing note reviewed.   Constitutional:       Appearance: Normal appearance.   Cardiovascular:      Rate and Rhythm: Normal rate and regular rhythm.      Heart sounds: Normal heart sounds.   Pulmonary:      Effort: Pulmonary effort is normal.      Breath sounds: Normal breath sounds.   Neurological:      Mental Status: She is alert and oriented to person, place, and time.   Psychiatric:         Mood and Affect: Mood normal.        Result Review :   The following data was reviewed by: ETHEL Madrigal on 11/05/2021:  Lipid Panel    Lipid Panel 5/6/21   Total Cholesterol 165   Triglycerides 172 (A)   HDL Cholesterol 75 (A)   VLDL Cholesterol 28   LDL Cholesterol  62   (A) Abnormal value       Comments are available for some flowsheets but are not being displayed.           BMP    BMP 5/6/21 8/5/21   BUN 16 18   Creatinine 1.14 (A) 1.18 (A)   Sodium 141 140   Potassium 5.4 (A) 4.8   Chloride 103 102   CO2 26.1 28.6   Calcium 10.0 10.2   (A) Abnormal value            Most Recent A1C    HGBA1C Most Recent 5/6/21   Hemoglobin " A1C 6.80 (A)   (A) Abnormal value       Comments are available for some flowsheets but are not being displayed.                  Assessment and Plan    Diagnoses and all orders for this visit:    1. Type 2 diabetes mellitus without complication, without long-term current use of insulin (HCC) (Primary)  -     Hemoglobin A1c  -     Microalbumin / Creatinine Urine Ratio - Urine, Clean Catch    2. Insomnia, unspecified type  Comments:  - Will increase Seroquel to 100 mg nightly.   Orders:  -     QUEtiapine (SEROquel) 100 MG tablet; Take 1 tablet by mouth Every Night.  Dispense: 90 tablet; Refill: 1    3. Chronic pain of both knees  -     Diclofenac Sodium (VOLTAREN) 1 % gel gel; Apply 4 g topically to the appropriate area as directed 4 (Four) Times a Day As Needed (knee pain).  Dispense: 350 g; Refill: 1    4. Screening mammogram for breast cancer  -     Mammo Screening Digital Tomosynthesis Bilateral With CAD; Future    5. Screening for colorectal cancer  -     Cologuard - Stool, Per Rectum; Future    6. Acute pain of right shoulder  -     cyclobenzaprine (FLEXERIL) 10 MG tablet; Take 1 tablet by mouth At Night As Needed for Muscle Spasms.  Dispense: 90 tablet; Refill: 1      I spent 20 minutes caring for Erin on this date of service. This time includes time spent by me in the following activities:reviewing tests, performing a medically appropriate examination and/or evaluation , counseling and educating the patient/family/caregiver, ordering medications, tests, or procedures and documenting information in the medical record  Follow Up   Return in about 6 months (around 5/10/2022) for Recheck, Annual physical.  Patient was given instructions and counseling regarding her condition or for health maintenance advice. Please see specific information pulled into the AVS if appropriate.

## 2021-11-06 LAB
ALBUMIN/CREAT UR: 177 MG/G CREAT (ref 0–29)
CREAT UR-MCNC: 85.3 MG/DL
HBA1C MFR BLD: 6.9 % (ref 4.8–5.6)
MICROALBUMIN UR-MCNC: 151.3 UG/ML

## 2021-11-15 RX ORDER — ROPINIROLE 0.5 MG/1
0.5 TABLET, FILM COATED ORAL NIGHTLY
Qty: 90 TABLET | Refills: 0 | Status: SHIPPED | OUTPATIENT
Start: 2021-11-15 | End: 2022-02-22

## 2021-11-30 DIAGNOSIS — I10 ESSENTIAL HYPERTENSION: ICD-10-CM

## 2021-11-30 RX ORDER — OLMESARTAN MEDOXOMIL 40 MG/1
40 TABLET ORAL DAILY
Qty: 90 TABLET | Refills: 0 | Status: SHIPPED | OUTPATIENT
Start: 2021-11-30 | End: 2022-02-28

## 2021-11-30 RX ORDER — PIOGLITAZONEHYDROCHLORIDE 15 MG/1
15 TABLET ORAL DAILY
Qty: 90 TABLET | Refills: 1 | Status: SHIPPED | OUTPATIENT
Start: 2021-11-30 | End: 2022-02-28

## 2021-12-22 ENCOUNTER — HOSPITAL ENCOUNTER (OUTPATIENT)
Dept: MAMMOGRAPHY | Facility: HOSPITAL | Age: 56
Discharge: HOME | End: 2021-12-22
Admitting: NURSE PRACTITIONER

## 2021-12-22 DIAGNOSIS — Z12.31 SCREENING MAMMOGRAM FOR BREAST CANCER: ICD-10-CM

## 2021-12-22 PROCEDURE — 77067 SCR MAMMO BI INCL CAD: CPT

## 2021-12-22 PROCEDURE — 77063 BREAST TOMOSYNTHESIS BI: CPT

## 2022-02-22 RX ORDER — ROPINIROLE 0.5 MG/1
TABLET, FILM COATED ORAL
Qty: 90 TABLET | Refills: 0 | Status: SHIPPED | OUTPATIENT
Start: 2022-02-22 | End: 2022-05-10 | Stop reason: SDUPTHER

## 2022-02-26 DIAGNOSIS — E11.9 TYPE 2 DIABETES MELLITUS WITHOUT COMPLICATION, WITHOUT LONG-TERM CURRENT USE OF INSULIN: Primary | ICD-10-CM

## 2022-02-26 DIAGNOSIS — I10 ESSENTIAL HYPERTENSION: ICD-10-CM

## 2022-02-28 RX ORDER — PIOGLITAZONEHYDROCHLORIDE 15 MG/1
15 TABLET ORAL DAILY
Qty: 90 TABLET | Refills: 1 | Status: SHIPPED | OUTPATIENT
Start: 2022-02-28 | End: 2022-05-11 | Stop reason: SDUPTHER

## 2022-02-28 RX ORDER — OLMESARTAN MEDOXOMIL 40 MG/1
40 TABLET ORAL DAILY
Qty: 90 TABLET | Refills: 0 | Status: SHIPPED | OUTPATIENT
Start: 2022-02-28 | End: 2022-05-10 | Stop reason: SDUPTHER

## 2022-04-20 ENCOUNTER — TELEPHONE (OUTPATIENT)
Dept: FAMILY MEDICINE CLINIC | Facility: CLINIC | Age: 57
End: 2022-04-20

## 2022-04-20 NOTE — TELEPHONE ENCOUNTER
LMTCB    **HUB/** may relay message and schedule appointment.    Per PCP - patient needs to schedule a telemedicine visit for pcp to fill out her FMLA paperwork.

## 2022-04-21 ENCOUNTER — TELEMEDICINE (OUTPATIENT)
Dept: FAMILY MEDICINE CLINIC | Facility: CLINIC | Age: 57
End: 2022-04-21

## 2022-04-21 DIAGNOSIS — G43.109 MIGRAINE WITH AURA AND WITHOUT STATUS MIGRAINOSUS, NOT INTRACTABLE: Primary | ICD-10-CM

## 2022-04-21 PROCEDURE — 99213 OFFICE O/P EST LOW 20 MIN: CPT | Performed by: NURSE PRACTITIONER

## 2022-04-21 NOTE — PROGRESS NOTES
Chief Complaint  Migraine  You have chosen to receive care through a telehealth visit.  Do you consent to use a video/audio connection for your medical care today? Yes via [employer]   Subjective          Erin Bailey presents to [facility]  History of Present Illness  Migraine: Patient reports doing well. Patient is currently asymptomatic. Current treatment includes sumatriptan. Patient is adherent to treatment regimen. Patient needs FMLA paperwork filled out for the 1-2 migraine headaches she has per month.   Objective   Vital Signs:   There were no vitals taken for this visit.        Physical Exam  Constitutional:       Appearance: Normal appearance.   Neurological:      Mental Status: She is alert.   Psychiatric:         Mood and Affect: Mood normal.        Result Review :            Assessment and Plan    Diagnoses and all orders for this visit:    1. Migraine with aura and without status migrainosus, not intractable (Primary)  Comments:  - Continue sumatriptan as directed.  - FMLA paperwork completed.       I spent 21 minutes caring for Erin on this date of service. This time includes time spent by me in the following activities:counseling and educating the patient/family/caregiver and documenting information in the medical record  Follow Up   Keep follow-up on 5/10/2022 as planned.   Patient was given instructions and counseling regarding her condition or for health maintenance advice. Please see specific information pulled into the AVS if appropriate.

## 2022-04-29 DIAGNOSIS — G47.00 INSOMNIA, UNSPECIFIED TYPE: ICD-10-CM

## 2022-04-29 DIAGNOSIS — E78.5 HYPERLIPIDEMIA, UNSPECIFIED HYPERLIPIDEMIA TYPE: ICD-10-CM

## 2022-04-29 RX ORDER — ATORVASTATIN CALCIUM 20 MG/1
20 TABLET, FILM COATED ORAL NIGHTLY
Qty: 30 TABLET | Refills: 0 | Status: SHIPPED | OUTPATIENT
Start: 2022-04-29 | End: 2022-05-11 | Stop reason: SDUPTHER

## 2022-04-29 RX ORDER — QUETIAPINE FUMARATE 100 MG/1
100 TABLET, FILM COATED ORAL NIGHTLY
Qty: 30 TABLET | Refills: 0 | Status: SHIPPED | OUTPATIENT
Start: 2022-04-29 | End: 2022-05-10

## 2022-05-10 ENCOUNTER — OFFICE VISIT (OUTPATIENT)
Dept: FAMILY MEDICINE CLINIC | Facility: CLINIC | Age: 57
End: 2022-05-10

## 2022-05-10 VITALS
DIASTOLIC BLOOD PRESSURE: 62 MMHG | TEMPERATURE: 98 F | OXYGEN SATURATION: 98 % | SYSTOLIC BLOOD PRESSURE: 110 MMHG | BODY MASS INDEX: 52.55 KG/M2 | HEART RATE: 100 BPM | WEIGHT: 293 LBS

## 2022-05-10 DIAGNOSIS — Z12.12 SCREENING FOR COLORECTAL CANCER: ICD-10-CM

## 2022-05-10 DIAGNOSIS — G25.81 RESTLESS LEG SYNDROME: ICD-10-CM

## 2022-05-10 DIAGNOSIS — M25.562 CHRONIC PAIN OF BOTH KNEES: ICD-10-CM

## 2022-05-10 DIAGNOSIS — M25.561 CHRONIC PAIN OF BOTH KNEES: ICD-10-CM

## 2022-05-10 DIAGNOSIS — Z00.00 WELL FEMALE EXAM WITHOUT GYNECOLOGICAL EXAM: Primary | ICD-10-CM

## 2022-05-10 DIAGNOSIS — Z12.11 SCREENING FOR COLORECTAL CANCER: ICD-10-CM

## 2022-05-10 DIAGNOSIS — M25.512 CHRONIC PAIN OF BOTH SHOULDERS: ICD-10-CM

## 2022-05-10 DIAGNOSIS — M25.511 CHRONIC PAIN OF BOTH SHOULDERS: ICD-10-CM

## 2022-05-10 DIAGNOSIS — G47.00 INSOMNIA, UNSPECIFIED TYPE: ICD-10-CM

## 2022-05-10 DIAGNOSIS — G89.29 CHRONIC PAIN OF BOTH SHOULDERS: ICD-10-CM

## 2022-05-10 DIAGNOSIS — G89.29 CHRONIC PAIN OF BOTH KNEES: ICD-10-CM

## 2022-05-10 DIAGNOSIS — E11.9 TYPE 2 DIABETES MELLITUS WITHOUT COMPLICATION, WITHOUT LONG-TERM CURRENT USE OF INSULIN: ICD-10-CM

## 2022-05-10 DIAGNOSIS — E78.5 HYPERLIPIDEMIA, UNSPECIFIED HYPERLIPIDEMIA TYPE: ICD-10-CM

## 2022-05-10 DIAGNOSIS — I10 ESSENTIAL HYPERTENSION: ICD-10-CM

## 2022-05-10 PROCEDURE — 99396 PREV VISIT EST AGE 40-64: CPT | Performed by: NURSE PRACTITIONER

## 2022-05-10 RX ORDER — OLMESARTAN MEDOXOMIL 40 MG/1
40 TABLET ORAL DAILY
Qty: 90 TABLET | Refills: 1 | Status: SHIPPED | OUTPATIENT
Start: 2022-05-10 | End: 2022-11-15 | Stop reason: SDUPTHER

## 2022-05-10 RX ORDER — CYCLOBENZAPRINE HCL 10 MG
10 TABLET ORAL NIGHTLY PRN
Qty: 90 TABLET | Refills: 1 | Status: SHIPPED | OUTPATIENT
Start: 2022-05-10 | End: 2022-08-03

## 2022-05-10 RX ORDER — QUETIAPINE 150 MG/1
150 TABLET, FILM COATED, EXTENDED RELEASE ORAL NIGHTLY
Qty: 90 TABLET | Refills: 1 | Status: SHIPPED | OUTPATIENT
Start: 2022-05-10 | End: 2022-11-11 | Stop reason: SDUPTHER

## 2022-05-10 RX ORDER — ROPINIROLE 1 MG/1
1 TABLET, FILM COATED ORAL NIGHTLY
Qty: 90 TABLET | Refills: 1 | Status: SHIPPED | OUTPATIENT
Start: 2022-05-10 | End: 2022-11-11 | Stop reason: SDUPTHER

## 2022-05-10 NOTE — PROGRESS NOTES
"Olive Bailey is a 56 y.o. female.     Chief Complaint   Patient presents with   • Diabetes       History of Present Illness   The patient is being seen for a health maintenance evaluation.  The last health maintenance visit was 1 year ago.  Social history: Household members include none.  She is .  Work status: Full-time.  The patient has never smoked cigarettes.  She reports occasional alcohol use.  General health: The patient's health since the last visit is described as good.  She does not have regular dental visits.  The patient brushes daily, flosses daily and reports her last dental visit is unknown.  She denies vision problems.  Vision care includes glasses and an eye exam within the past year.  She denies hearing loss.  Immunization status: Up-to-date.  Lifestyle: She does not exercise regularly.  Reproductive health: She is not sexually active.  Screening: A Pap smear was performed on 10/6/2020.  A mammogram was performed on 12/22/2021.  Patient never received Cologuard.    The following portions of the patient's history were reviewed and updated as appropriate: allergies, current medications, past family history, past medical history, past social history, past surgical history and problem list.    Past Medical History:   Diagnosis Date   • Allergic    • Diabetes mellitus (HCC)    • Hyperlipemia    • Hypertension    • Insomnia    • Knee injury     left   • Knee swelling June 2021   • Migraine    • Obesity    • Restless leg        Past Surgical History:   Procedure Laterality Date   • BREAST BIOPSY Bilateral 2015    with excision/ atypical cells  bilateral   • BREAST EXCISIONAL BIOPSY Bilateral 2015    \"\"\"\" after bx infection left breast (open wound care)   • BREAST SURGERY     • CHOLECYSTECTOMY     • CHOLECYSTECTOMY     • HERNIA REPAIR     • HERNIA REPAIR     • TONSILLECTOMY         Family History   Problem Relation Age of Onset   • Prostate cancer Father    • Cancer Father         " Prostate - clear   • Colon cancer Sister    • Cancer Sister         Rectal Clear   • Diabetes Maternal Grandmother        Social History     Socioeconomic History   • Marital status:    Tobacco Use   • Smoking status: Never Smoker   • Smokeless tobacco: Never Used   Vaping Use   • Vaping Use: Never used   Substance and Sexual Activity   • Alcohol use: Yes     Alcohol/week: 1.0 standard drink     Types: 1 Glasses of wine per week     Comment: socially    • Drug use: Never   • Sexual activity: Not Currently     Partners: Male     Birth control/protection: Post-menopausal       Review of Systems   Constitutional: Negative for fever.   HENT: Negative for ear pain, rhinorrhea and sore throat.    Eyes: Negative for visual disturbance.   Respiratory: Negative for cough and shortness of breath.    Cardiovascular: Negative for chest pain.   Gastrointestinal: Negative for abdominal pain, diarrhea, nausea and vomiting.   Genitourinary: Negative.    Musculoskeletal: Negative.    Skin: Negative for rash.   Neurological: Negative for dizziness and headache.   Psychiatric/Behavioral: Positive for sleep disturbance.       Objective   Vitals:    05/10/22 1404   BP: 110/62   BP Location: Left arm   Patient Position: Sitting   Cuff Size: Large Adult   Pulse: 100   Temp: 98 °F (36.7 °C)   TempSrc: Temporal   SpO2: 98%   Weight: (!) 152 kg (335 lb 9.6 oz)      Body mass index is 52.55 kg/m².  Physical Exam  Vitals and nursing note reviewed.   Constitutional:       Appearance: Normal appearance.   HENT:      Head: Normocephalic and atraumatic.      Right Ear: Tympanic membrane and ear canal normal.      Left Ear: Tympanic membrane and ear canal normal.   Eyes:      Conjunctiva/sclera: Conjunctivae normal.      Pupils: Pupils are equal, round, and reactive to light.   Cardiovascular:      Rate and Rhythm: Normal rate and regular rhythm.      Heart sounds: Normal heart sounds.   Pulmonary:      Effort: Pulmonary effort is normal.       Breath sounds: Normal breath sounds.   Abdominal:      General: Bowel sounds are normal.      Palpations: Abdomen is soft.      Tenderness: There is no abdominal tenderness.   Genitourinary:     Comments: Deferred   Musculoskeletal:         General: Normal range of motion.      Cervical back: Neck supple.   Skin:     General: Skin is warm and dry.   Neurological:      Mental Status: She is alert and oriented to person, place, and time.   Psychiatric:         Mood and Affect: Mood normal.         Diagnoses and all orders for this visit:    1. Well female exam without gynecological exam (Primary)    2. Type 2 diabetes mellitus without complication, without long-term current use of insulin (HCC)  -     Hemoglobin A1c  -     Comprehensive Metabolic Panel    3. Hyperlipidemia, unspecified hyperlipidemia type  -     Lipid Panel With / Chol / HDL Ratio  -     Comprehensive Metabolic Panel    4. Screening for colorectal cancer  -     Cologuard - Stool, Per Rectum; Future    5. Chronic pain of both shoulders  -     cyclobenzaprine (FLEXERIL) 10 MG tablet; Take 1 tablet by mouth At Night As Needed for Muscle Spasms.  Dispense: 90 tablet; Refill: 1    6. Chronic pain of both knees  -     Diclofenac Sodium (VOLTAREN) 1 % gel gel; Apply 4 g topically to the appropriate area as directed 4 (Four) Times a Day As Needed (knee pain).  Dispense: 350 g; Refill: 1    7. Essential hypertension  -     olmesartan (BENICAR) 40 MG tablet; Take 1 tablet by mouth Daily.  Dispense: 90 tablet; Refill: 1    8. Insomnia, unspecified type  Comments:  - Will increase Seroquel to 150 mg daily.  Will switch to Seroquel XR.  Orders:  -     QUEtiapine XR (SEROquel XR) 150 MG 24 hr tablet; Take 1 tablet by mouth Every Night.  Dispense: 90 tablet; Refill: 1    9. Restless leg syndrome  Comments:  - Will increase ropinirole to 1 mg nightly.  Orders:  -     rOPINIRole (REQUIP) 1 MG tablet; Take 1 tablet by mouth Every Night. Take 1 hour before bedtime   Dispense: 90 tablet; Refill: 1    Impression: Currently, she has an inadequate exercise regimen. Cervical cancer screening is current.  Breast cancer screening is current.  Will reorder Cologuard.  Lab work includes A1c, CMP and lipid panel.  Vaccinations are up-to-date.  Advice and education were given regarding aerobic exercise.

## 2022-05-11 DIAGNOSIS — E11.9 TYPE 2 DIABETES MELLITUS WITHOUT COMPLICATION, WITHOUT LONG-TERM CURRENT USE OF INSULIN: ICD-10-CM

## 2022-05-11 DIAGNOSIS — E78.5 HYPERLIPIDEMIA, UNSPECIFIED HYPERLIPIDEMIA TYPE: ICD-10-CM

## 2022-05-11 LAB
ALBUMIN SERPL-MCNC: 4.4 G/DL (ref 3.8–4.9)
ALBUMIN/GLOB SERPL: 1.4 {RATIO} (ref 1.2–2.2)
ALP SERPL-CCNC: 125 IU/L (ref 44–121)
ALT SERPL-CCNC: 15 IU/L (ref 0–32)
AST SERPL-CCNC: 18 IU/L (ref 0–40)
BILIRUB SERPL-MCNC: 0.3 MG/DL (ref 0–1.2)
BUN SERPL-MCNC: 15 MG/DL (ref 6–24)
BUN/CREAT SERPL: 13 (ref 9–23)
CALCIUM SERPL-MCNC: 9.6 MG/DL (ref 8.7–10.2)
CHLORIDE SERPL-SCNC: 99 MMOL/L (ref 96–106)
CHOLEST SERPL-MCNC: 168 MG/DL (ref 100–199)
CHOLEST/HDLC SERPL: 3.1 RATIO (ref 0–4.4)
CO2 SERPL-SCNC: 23 MMOL/L (ref 20–29)
CREAT SERPL-MCNC: 1.12 MG/DL (ref 0.57–1)
EGFRCR SERPLBLD CKD-EPI 2021: 57 ML/MIN/1.73
GLOBULIN SER CALC-MCNC: 3.2 G/DL (ref 1.5–4.5)
GLUCOSE SERPL-MCNC: 104 MG/DL (ref 65–99)
HBA1C MFR BLD: 6.6 % (ref 4.8–5.6)
HDLC SERPL-MCNC: 54 MG/DL
LDLC SERPL CALC-MCNC: 84 MG/DL (ref 0–99)
POTASSIUM SERPL-SCNC: 4.6 MMOL/L (ref 3.5–5.2)
PROT SERPL-MCNC: 7.6 G/DL (ref 6–8.5)
SODIUM SERPL-SCNC: 140 MMOL/L (ref 134–144)
TRIGL SERPL-MCNC: 178 MG/DL (ref 0–149)
VLDLC SERPL CALC-MCNC: 30 MG/DL (ref 5–40)

## 2022-05-11 RX ORDER — PIOGLITAZONEHYDROCHLORIDE 15 MG/1
15 TABLET ORAL DAILY
Qty: 90 TABLET | Refills: 1 | Status: SHIPPED | OUTPATIENT
Start: 2022-05-11 | End: 2022-11-15 | Stop reason: SDUPTHER

## 2022-05-11 RX ORDER — ATORVASTATIN CALCIUM 20 MG/1
20 TABLET, FILM COATED ORAL NIGHTLY
Qty: 90 TABLET | Refills: 3 | Status: SHIPPED | OUTPATIENT
Start: 2022-05-11

## 2022-06-07 DIAGNOSIS — G47.00 INSOMNIA, UNSPECIFIED TYPE: ICD-10-CM

## 2022-06-07 RX ORDER — QUETIAPINE FUMARATE 100 MG/1
100 TABLET, FILM COATED ORAL NIGHTLY
Qty: 30 TABLET | Refills: 0 | OUTPATIENT
Start: 2022-06-07

## 2022-06-07 NOTE — TELEPHONE ENCOUNTER
Rx Refill Note  Requested Prescriptions     Pending Prescriptions Disp Refills   • QUEtiapine (SEROquel) 100 MG tablet [Pharmacy Med Name: QUETIAPINE 100MG TABLETS] 30 tablet 0     Sig: TAKE 1 TABLET BY MOUTH EVERY NIGHT      Last office visit with prescribing clinician: 5/10/2022      Next office visit with prescribing clinician: Visit date not found   3}  Priya Choi MA  06/07/22, 16:35 EDT     QUEtiapine (SEROquel) 100 MG tablet     The original prescription was discontinued on 5/10/2022 by Essence Neal, ETHEL. Renewing this prescription may not be appropriate

## 2022-07-15 NOTE — TELEPHONE ENCOUNTER
Rx Refill Note  Requested Prescriptions     Pending Prescriptions Disp Refills   • rOPINIRole (REQUIP) 0.5 MG tablet [Pharmacy Med Name: ROPINIROLE 0.5MG TABLETS] 90 tablet 0     Sig: TAKE 1 TABLET BY MOUTH EVERY NIGHT 1 HOUR BEFORE BEDTIME      Last office visit with prescribing clinician: 5/10/2022      Next office visit with prescribing clinician: Visit date not found     Last refill 05/10/2022           Marlene Walden MA  07/15/22, 09:36 EDT

## 2022-07-15 NOTE — TELEPHONE ENCOUNTER
Please verify with patient that ropinirole was increased to 1 mg and was already sent to pharmacy.

## 2022-07-15 NOTE — TELEPHONE ENCOUNTER
Please verify with patient that ropinirole was increased to 1 mg and was already sent to pharmacy.     LMTCB    **HUB/** MAY ASK PATIENT AND ROUTE RESPONSE BACK TO PCP

## 2022-07-18 RX ORDER — ROPINIROLE 0.5 MG/1
TABLET, FILM COATED ORAL
Qty: 90 TABLET | Refills: 0 | OUTPATIENT
Start: 2022-07-18

## 2022-07-18 NOTE — TELEPHONE ENCOUNTER
Spoke with patient and she is taking the 1 mg, she said this refill request was an old automated one.

## 2022-07-27 ENCOUNTER — TELEPHONE (OUTPATIENT)
Dept: FAMILY MEDICINE CLINIC | Facility: CLINIC | Age: 57
End: 2022-07-27

## 2022-07-27 NOTE — TELEPHONE ENCOUNTER
Caller: Erin Bailey    Relationship to patient: Self    Best call back number: [phone]    Chief complaint: BACK PAIN, FREQUENCY AND THROBBING WHEN URINATING    Type of visit: OFFICE VISIT OR SAME DAY    Requested date: PATIENT IS REQUESTING TO SEE ANYONE THIS Friday, 07/29/2022    If rescheduling, when is the original appointment: N/A - THE PATIENT DECLINED AN APPOINTMENT TOMORROW, 07/28/2022, UNLESS IT IS BEFORE 10 A.M.

## 2022-07-29 ENCOUNTER — OFFICE VISIT (OUTPATIENT)
Dept: FAMILY MEDICINE CLINIC | Facility: CLINIC | Age: 57
End: 2022-07-29

## 2022-07-29 VITALS
HEIGHT: 66 IN | BODY MASS INDEX: 47.09 KG/M2 | HEART RATE: 111 BPM | WEIGHT: 293 LBS | DIASTOLIC BLOOD PRESSURE: 82 MMHG | SYSTOLIC BLOOD PRESSURE: 132 MMHG | TEMPERATURE: 98.9 F | OXYGEN SATURATION: 99 %

## 2022-07-29 DIAGNOSIS — M54.50 ACUTE MIDLINE LOW BACK PAIN WITHOUT SCIATICA: ICD-10-CM

## 2022-07-29 DIAGNOSIS — R19.7 DIARRHEA, UNSPECIFIED TYPE: ICD-10-CM

## 2022-07-29 DIAGNOSIS — R30.0 DYSURIA: Primary | ICD-10-CM

## 2022-07-29 LAB
BILIRUB BLD-MCNC: NEGATIVE MG/DL
CLARITY, POC: CLEAR
COLOR UR: YELLOW
EXPIRATION DATE: ABNORMAL
GLUCOSE UR STRIP-MCNC: NEGATIVE MG/DL
KETONES UR QL: NEGATIVE
LEUKOCYTE EST, POC: ABNORMAL
Lab: ABNORMAL
NITRITE UR-MCNC: NEGATIVE MG/ML
PH UR: 6 [PH] (ref 5–8)
PROT UR STRIP-MCNC: ABNORMAL MG/DL
RBC # UR STRIP: NEGATIVE /UL
SP GR UR: 1.02 (ref 1–1.03)
UROBILINOGEN UR QL: NORMAL

## 2022-07-29 PROCEDURE — 81003 URINALYSIS AUTO W/O SCOPE: CPT | Performed by: NURSE PRACTITIONER

## 2022-07-29 PROCEDURE — 99214 OFFICE O/P EST MOD 30 MIN: CPT | Performed by: NURSE PRACTITIONER

## 2022-07-29 RX ORDER — PREDNISONE 20 MG/1
40 TABLET ORAL DAILY
Qty: 10 TABLET | Refills: 0 | Status: SHIPPED | OUTPATIENT
Start: 2022-07-29 | End: 2022-08-03

## 2022-07-29 RX ORDER — DICYCLOMINE HCL 20 MG
20 TABLET ORAL EVERY 6 HOURS PRN
Qty: 120 TABLET | Refills: 0 | Status: SHIPPED | OUTPATIENT
Start: 2022-07-29

## 2022-07-29 RX ORDER — TIZANIDINE 4 MG/1
4 TABLET ORAL
COMMUNITY
Start: 2022-07-26 | End: 2022-08-03 | Stop reason: SDUPTHER

## 2022-07-30 LAB
ALBUMIN SERPL-MCNC: 4.2 G/DL (ref 3.8–4.9)
ALBUMIN/GLOB SERPL: 1.4 {RATIO} (ref 1.2–2.2)
ALP SERPL-CCNC: 105 IU/L (ref 44–121)
ALT SERPL-CCNC: 16 IU/L (ref 0–32)
AST SERPL-CCNC: 21 IU/L (ref 0–40)
BASOPHILS # BLD AUTO: 0 X10E3/UL (ref 0–0.2)
BASOPHILS NFR BLD AUTO: 0 %
BILIRUB SERPL-MCNC: 0.3 MG/DL (ref 0–1.2)
BUN SERPL-MCNC: 23 MG/DL (ref 6–24)
BUN/CREAT SERPL: 14 (ref 9–23)
CALCIUM SERPL-MCNC: 10.8 MG/DL (ref 8.7–10.2)
CHLORIDE SERPL-SCNC: 101 MMOL/L (ref 96–106)
CO2 SERPL-SCNC: 23 MMOL/L (ref 20–29)
CREAT SERPL-MCNC: 1.62 MG/DL (ref 0.57–1)
EGFRCR SERPLBLD CKD-EPI 2021: 37 ML/MIN/1.73
EOSINOPHIL # BLD AUTO: 0.6 X10E3/UL (ref 0–0.4)
EOSINOPHIL NFR BLD AUTO: 7 %
ERYTHROCYTE [DISTWIDTH] IN BLOOD BY AUTOMATED COUNT: 13.8 % (ref 11.7–15.4)
GLOBULIN SER CALC-MCNC: 3.1 G/DL (ref 1.5–4.5)
GLUCOSE SERPL-MCNC: 116 MG/DL (ref 65–99)
HCT VFR BLD AUTO: 39.3 % (ref 34–46.6)
HGB BLD-MCNC: 12.3 G/DL (ref 11.1–15.9)
IMM GRANULOCYTES # BLD AUTO: 0 X10E3/UL (ref 0–0.1)
IMM GRANULOCYTES NFR BLD AUTO: 0 %
LYMPHOCYTES # BLD AUTO: 2.7 X10E3/UL (ref 0.7–3.1)
LYMPHOCYTES NFR BLD AUTO: 28 %
MCH RBC QN AUTO: 27.2 PG (ref 26.6–33)
MCHC RBC AUTO-ENTMCNC: 31.3 G/DL (ref 31.5–35.7)
MCV RBC AUTO: 87 FL (ref 79–97)
MONOCYTES # BLD AUTO: 0.9 X10E3/UL (ref 0.1–0.9)
MONOCYTES NFR BLD AUTO: 9 %
NEUTROPHILS # BLD AUTO: 5.4 X10E3/UL (ref 1.4–7)
NEUTROPHILS NFR BLD AUTO: 56 %
PLATELET # BLD AUTO: 378 X10E3/UL (ref 150–450)
POTASSIUM SERPL-SCNC: 5.2 MMOL/L (ref 3.5–5.2)
PROT SERPL-MCNC: 7.3 G/DL (ref 6–8.5)
RBC # BLD AUTO: 4.53 X10E6/UL (ref 3.77–5.28)
SODIUM SERPL-SCNC: 141 MMOL/L (ref 134–144)
WBC # BLD AUTO: 9.7 X10E3/UL (ref 3.4–10.8)

## 2022-08-03 ENCOUNTER — TELEPHONE (OUTPATIENT)
Dept: FAMILY MEDICINE CLINIC | Facility: CLINIC | Age: 57
End: 2022-08-03

## 2022-08-03 DIAGNOSIS — N18.32 STAGE 3B CHRONIC KIDNEY DISEASE: ICD-10-CM

## 2022-08-03 DIAGNOSIS — E83.52 HYPERCALCEMIA: Primary | ICD-10-CM

## 2022-08-03 LAB
BACTERIA UR CULT: NORMAL
BACTERIA UR CULT: NORMAL

## 2022-08-03 RX ORDER — TIZANIDINE 4 MG/1
4 TABLET ORAL NIGHTLY PRN
Qty: 30 TABLET | Refills: 1 | Status: SHIPPED | OUTPATIENT
Start: 2022-08-03 | End: 2022-09-14

## 2022-08-03 NOTE — TELEPHONE ENCOUNTER
Will need to refer to nephrology due to significant change in GFR.  Calcium level is elevated so would recommend repeating calcium and parathyroid hormone level in 2 weeks.  Liver function tests are normal.  CBC is within acceptable limits.  No anemia or infection.    Patient aware of results and recommendations.

## 2022-08-09 DIAGNOSIS — G47.00 INSOMNIA, UNSPECIFIED TYPE: ICD-10-CM

## 2022-08-09 DIAGNOSIS — G25.81 RESTLESS LEG SYNDROME: ICD-10-CM

## 2022-08-17 RX ORDER — QUETIAPINE 150 MG/1
150 TABLET, FILM COATED, EXTENDED RELEASE ORAL NIGHTLY
Qty: 90 TABLET | Refills: 1 | OUTPATIENT
Start: 2022-08-17

## 2022-08-17 RX ORDER — ROPINIROLE 1 MG/1
TABLET, FILM COATED ORAL
Qty: 90 TABLET | Refills: 1 | OUTPATIENT
Start: 2022-08-17

## 2022-08-19 ENCOUNTER — TELEPHONE (OUTPATIENT)
Dept: FAMILY MEDICINE CLINIC | Facility: CLINIC | Age: 57
End: 2022-08-19

## 2022-08-19 DIAGNOSIS — U07.1 COVID-19 VIRUS DETECTED: Primary | ICD-10-CM

## 2022-08-19 NOTE — TELEPHONE ENCOUNTER
Caller: Erin Bailey    Relationship: Self    Best call back number: [phone] (H)    PATIENT IS REQUESTING A NURSE TO PLEASE CALL HER BACK.    PATIENT DID TEST POSITIVE FOR  COVID YESTERDAY 08/19/22 AND HAD A PCR TEST TODAY.   YESTERDAY'S TEST WAS DONE AT HOME AND IT WAS INSTANTLY POSITIVE, SHE IS STILL WAITING ON THE PCR ONE FROM TODAY 08/19/22.      SHE HAS QUESTIONS ABOUT BEING POSITIVE, FOR IT'S HER FIRST TIME WITH COVIFD,  AND SHE HAS UNDERLYING HEALTH CONDITIONS- WONDERING ABOUT PAXLOVID AND IS SHE IS ELIGIBLE FOR IT OR NOT.         FEW FACTS: SHE HAD HER SECOND BOOSTER SHOT FOR COVID 08/12/22 AND IMMEDIATELY GOT A FEVER THAT SAME NIGHT FOR 3 NIGHTS, 102, 100, 101 EACH NIGHT.      SHE CURRENTLY HAS A RUNNY NOSE, COUGHING, AND WHEEZING, AND SOME BODY ACHES AND CHEST CONGESTION, PHLEGM/PRODUCTIVE COUGH.     PLEASE ADVISE.

## 2022-09-14 RX ORDER — TIZANIDINE 4 MG/1
4 TABLET ORAL NIGHTLY PRN
Qty: 30 TABLET | Refills: 1 | Status: SHIPPED | OUTPATIENT
Start: 2022-09-14

## 2022-11-08 DIAGNOSIS — G47.00 INSOMNIA, UNSPECIFIED TYPE: ICD-10-CM

## 2022-11-08 DIAGNOSIS — G25.81 RESTLESS LEG SYNDROME: ICD-10-CM

## 2022-11-08 RX ORDER — ROPINIROLE 1 MG/1
TABLET, FILM COATED ORAL
Qty: 90 TABLET | Refills: 1 | OUTPATIENT
Start: 2022-11-08

## 2022-11-08 RX ORDER — QUETIAPINE 150 MG/1
150 TABLET, FILM COATED, EXTENDED RELEASE ORAL NIGHTLY
Qty: 90 TABLET | Refills: 1 | OUTPATIENT
Start: 2022-11-08

## 2022-11-09 RX ORDER — QUETIAPINE 150 MG/1
150 TABLET, FILM COATED, EXTENDED RELEASE ORAL NIGHTLY
Qty: 90 TABLET | Refills: 1 | OUTPATIENT
Start: 2022-11-09

## 2022-11-09 RX ORDER — ROPINIROLE 1 MG/1
1 TABLET, FILM COATED ORAL NIGHTLY
Qty: 90 TABLET | Refills: 1 | OUTPATIENT
Start: 2022-11-09

## 2022-11-10 DIAGNOSIS — G25.81 RESTLESS LEG SYNDROME: ICD-10-CM

## 2022-11-10 DIAGNOSIS — G47.00 INSOMNIA, UNSPECIFIED TYPE: ICD-10-CM

## 2022-11-10 RX ORDER — ROPINIROLE 0.5 MG/1
TABLET, FILM COATED ORAL
Qty: 90 TABLET | Refills: 0 | OUTPATIENT
Start: 2022-11-10

## 2022-11-11 DIAGNOSIS — G47.00 INSOMNIA, UNSPECIFIED TYPE: ICD-10-CM

## 2022-11-11 DIAGNOSIS — G25.81 RESTLESS LEG SYNDROME: ICD-10-CM

## 2022-11-11 RX ORDER — QUETIAPINE 150 MG/1
150 TABLET, FILM COATED, EXTENDED RELEASE ORAL NIGHTLY
Qty: 90 TABLET | Refills: 0 | Status: SHIPPED | OUTPATIENT
Start: 2022-11-11 | End: 2022-11-15 | Stop reason: SDUPTHER

## 2022-11-11 RX ORDER — QUETIAPINE 150 MG/1
150 TABLET, FILM COATED, EXTENDED RELEASE ORAL NIGHTLY
Qty: 90 TABLET | Refills: 1 | OUTPATIENT
Start: 2022-11-11

## 2022-11-11 RX ORDER — ROPINIROLE 1 MG/1
1 TABLET, FILM COATED ORAL NIGHTLY
Qty: 90 TABLET | Refills: 1 | OUTPATIENT
Start: 2022-11-11

## 2022-11-11 RX ORDER — ROPINIROLE 1 MG/1
1 TABLET, FILM COATED ORAL NIGHTLY
Qty: 90 TABLET | Refills: 0 | Status: SHIPPED | OUTPATIENT
Start: 2022-11-11 | End: 2022-11-15 | Stop reason: SDUPTHER

## 2022-11-11 NOTE — TELEPHONE ENCOUNTER
PATIENT HAS BEEN WAITING FOR THIS MEDICATION TO BE CALLED IN FOR A FEW DAYS NOW. PLEASE ADVISE AS SOON AS POSSIBLE

## 2022-11-15 ENCOUNTER — OFFICE VISIT (OUTPATIENT)
Dept: FAMILY MEDICINE CLINIC | Facility: CLINIC | Age: 57
End: 2022-11-15

## 2022-11-15 VITALS
WEIGHT: 293 LBS | HEART RATE: 117 BPM | BODY MASS INDEX: 55.52 KG/M2 | TEMPERATURE: 97.9 F | SYSTOLIC BLOOD PRESSURE: 109 MMHG | DIASTOLIC BLOOD PRESSURE: 57 MMHG | OXYGEN SATURATION: 97 %

## 2022-11-15 DIAGNOSIS — I10 ESSENTIAL HYPERTENSION: ICD-10-CM

## 2022-11-15 DIAGNOSIS — E83.52 HYPERCALCEMIA: ICD-10-CM

## 2022-11-15 DIAGNOSIS — R01.1 MURMUR: ICD-10-CM

## 2022-11-15 DIAGNOSIS — G47.00 INSOMNIA, UNSPECIFIED TYPE: Primary | ICD-10-CM

## 2022-11-15 DIAGNOSIS — L02.411 ABSCESS OF AXILLA, RIGHT: ICD-10-CM

## 2022-11-15 DIAGNOSIS — G25.81 RESTLESS LEG SYNDROME: ICD-10-CM

## 2022-11-15 DIAGNOSIS — E11.9 TYPE 2 DIABETES MELLITUS WITHOUT COMPLICATION, WITHOUT LONG-TERM CURRENT USE OF INSULIN: ICD-10-CM

## 2022-11-15 PROCEDURE — 99214 OFFICE O/P EST MOD 30 MIN: CPT | Performed by: NURSE PRACTITIONER

## 2022-11-15 RX ORDER — ROPINIROLE 1 MG/1
1 TABLET, FILM COATED ORAL NIGHTLY
Qty: 90 TABLET | Refills: 3 | Status: SHIPPED | OUTPATIENT
Start: 2022-11-15

## 2022-11-15 RX ORDER — PIOGLITAZONEHYDROCHLORIDE 15 MG/1
15 TABLET ORAL DAILY
Qty: 90 TABLET | Refills: 3 | Status: SHIPPED | OUTPATIENT
Start: 2022-11-15

## 2022-11-15 RX ORDER — FLUCONAZOLE 150 MG/1
150 TABLET ORAL ONCE
Qty: 1 TABLET | Refills: 0 | Status: SHIPPED | OUTPATIENT
Start: 2022-11-15 | End: 2022-11-15

## 2022-11-15 RX ORDER — QUETIAPINE 150 MG/1
150 TABLET, FILM COATED, EXTENDED RELEASE ORAL NIGHTLY
Qty: 90 TABLET | Refills: 3 | Status: SHIPPED | OUTPATIENT
Start: 2022-11-15 | End: 2023-02-13 | Stop reason: SDUPTHER

## 2022-11-15 RX ORDER — ROPINIROLE 0.5 MG/1
0.5 TABLET, FILM COATED ORAL NIGHTLY
Qty: 90 TABLET | Refills: 3 | Status: SHIPPED | OUTPATIENT
Start: 2022-11-15

## 2022-11-15 RX ORDER — CEPHALEXIN 500 MG/1
500 CAPSULE ORAL 4 TIMES DAILY
Qty: 40 CAPSULE | Refills: 0 | Status: SHIPPED | OUTPATIENT
Start: 2022-11-15 | End: 2022-11-25

## 2022-11-15 RX ORDER — OLMESARTAN MEDOXOMIL 40 MG/1
40 TABLET ORAL DAILY
Qty: 90 TABLET | Refills: 3 | Status: SHIPPED | OUTPATIENT
Start: 2022-11-15

## 2022-11-15 NOTE — PROGRESS NOTES
"Chief Complaint  Insomnia and Restless Legs Syndrome    Subjective        Erin Bailey presents to [facility]  History of Present Illness  Insomnia: Patient reports doing well. Patient is currently asymptomatic. Current treatment includes Seroquel. Patient is adherent to treatment regimen.    Restless leg syndrome: Patient reports doing poorly. Current treatment includes ropinirole 1 mg. Patient is adherent to treatment regimen.   Objective   Vital Signs:  /57 (BP Location: Right arm, Patient Position: Sitting, Cuff Size: Large Adult)   Pulse 117   Temp 97.9 °F (36.6 °C) (Temporal)   Wt (!) 156 kg (344 lb)   SpO2 97%   BMI 55.52 kg/m²   Estimated body mass index is 55.52 kg/m² as calculated from the following:    Height as of 7/29/22: 167.6 cm (66\").    Weight as of this encounter: 156 kg (344 lb).        Physical Exam  Vitals and nursing note reviewed.   Constitutional:       Appearance: Normal appearance.   Cardiovascular:      Rate and Rhythm: Normal rate and regular rhythm.      Heart sounds: Murmur heard.   Pulmonary:      Effort: Pulmonary effort is normal.      Breath sounds: Normal breath sounds.   Skin:     Comments: Draining abscess in right axilla   Neurological:      Mental Status: She is alert and oriented to person, place, and time.   Psychiatric:         Mood and Affect: Mood normal.        Result Review :  The following data was reviewed by: ETHEL Madrigal on 11/15/2022:  CMP    CMP 5/10/22 7/29/22   Glucose 104 (A) 116 (A)   BUN 15 23   Creatinine 1.12 (A) 1.62 (A)   Sodium 140 141   Potassium 4.6 5.2   Chloride 99 101   Calcium 9.6 10.8 (A)   Total Protein 7.6 7.3   Albumin 4.4 4.2   Globulin 3.2 3.1   Total Bilirubin 0.3 0.3   Alkaline Phosphatase 125 (A) 105   AST (SGOT) 18 21   ALT (SGPT) 15 16   (A) Abnormal value            Most Recent A1C    HGBA1C Most Recent 5/10/22   Hemoglobin A1C 6.6 (A)   (A) Abnormal value       Comments are " available for some flowsheets but are not being displayed.                  Assessment and Plan   Diagnoses and all orders for this visit:    1. Insomnia, unspecified type (Primary)  -     QUEtiapine XR (SEROquel XR) 150 MG 24 hr tablet; Take 1 tablet by mouth Every Night.  Dispense: 90 tablet; Refill: 3    2. Restless leg syndrome  Comments:  - Will increase ropinirole to 1.5 mg nightly.  Orders:  -     rOPINIRole (Requip) 0.5 MG tablet; Take 1 tablet by mouth Every Night. TAKE WITH ROPINIROLE 1 MG TAB  Dispense: 90 tablet; Refill: 3  -     rOPINIRole (REQUIP) 1 MG tablet; Take 1 tablet by mouth Every Night. Take 1 hour before bedtime  Dispense: 90 tablet; Refill: 3    3. Essential hypertension  -     olmesartan (BENICAR) 40 MG tablet; Take 1 tablet by mouth Daily.  Dispense: 90 tablet; Refill: 3    4. Type 2 diabetes mellitus without complication, without long-term current use of insulin ([facility])  -     Hemoglobin A1c  -     Microalbumin / Creatinine Urine Ratio - Urine, Clean Catch  -     pioglitazone (ACTOS) 15 MG tablet; Take 1 tablet by mouth Daily.  Dispense: 90 tablet; Refill: 3    5. Hypercalcemia  -     Basic metabolic panel  -     PTH, Intact    6. Abscess of axilla, right  -     cephalexin (Keflex) 500 MG capsule; Take 1 capsule by mouth 4 (Four) Times a Day for 10 days.  Dispense: 40 capsule; Refill: 0    7. Murmur  -     Ambulatory Referral to Cardiology    Other orders  -     fluconazole (DIFLUCAN) 150 MG tablet; Take 1 tablet by mouth 1 (One) Time for 1 dose.  Dispense: 1 tablet; Refill: 0           I spent 30 minutes caring for Erin on this date of service. This time includes time spent by me in the following activities:reviewing tests, performing a medically appropriate examination and/or evaluation , counseling and educating the patient/family/caregiver, ordering medications, tests, or procedures and documenting information in the medical record  Follow Up   Return in about 6 months (around  5/11/2023) for Recheck, Annual physical.  Patient was given instructions and counseling regarding her condition or for health maintenance advice. Please see specific information pulled into the AVS if appropriate.

## 2022-11-16 LAB
ALBUMIN/CREAT UR: 49 MG/G CREAT (ref 0–29)
BUN SERPL-MCNC: 30 MG/DL (ref 6–20)
BUN/CREAT SERPL: 22.7 (ref 7–25)
CALCIUM SERPL-MCNC: 9.7 MG/DL (ref 8.6–10.5)
CHLORIDE SERPL-SCNC: 102 MMOL/L (ref 98–107)
CO2 SERPL-SCNC: 24.7 MMOL/L (ref 22–29)
CREAT SERPL-MCNC: 1.32 MG/DL (ref 0.57–1)
CREAT UR-MCNC: 138.7 MG/DL
EGFRCR SERPLBLD CKD-EPI 2021: 46.9 ML/MIN/1.73
GLUCOSE SERPL-MCNC: 121 MG/DL (ref 65–99)
HBA1C MFR BLD: 6.7 % (ref 4.8–5.6)
MICROALBUMIN UR-MCNC: 68.3 UG/ML
POTASSIUM SERPL-SCNC: 4.6 MMOL/L (ref 3.5–5.2)
PTH-INTACT SERPL-MCNC: 15 PG/ML (ref 15–65)
SODIUM SERPL-SCNC: 139 MMOL/L (ref 136–145)

## 2022-12-16 ENCOUNTER — OFFICE VISIT (OUTPATIENT)
Dept: CARDIOLOGY | Facility: CLINIC | Age: 57
End: 2022-12-16

## 2022-12-16 VITALS
BODY MASS INDEX: 47.09 KG/M2 | HEART RATE: 96 BPM | DIASTOLIC BLOOD PRESSURE: 82 MMHG | WEIGHT: 293 LBS | HEIGHT: 66 IN | SYSTOLIC BLOOD PRESSURE: 132 MMHG

## 2022-12-16 DIAGNOSIS — R01.1 SYSTOLIC MURMUR: ICD-10-CM

## 2022-12-16 DIAGNOSIS — R06.09 DYSPNEA ON EXERTION: Primary | ICD-10-CM

## 2022-12-16 PROCEDURE — 99204 OFFICE O/P NEW MOD 45 MIN: CPT | Performed by: STUDENT IN AN ORGANIZED HEALTH CARE EDUCATION/TRAINING PROGRAM

## 2022-12-16 PROCEDURE — 93000 ELECTROCARDIOGRAM COMPLETE: CPT | Performed by: STUDENT IN AN ORGANIZED HEALTH CARE EDUCATION/TRAINING PROGRAM

## 2022-12-16 NOTE — PROGRESS NOTES
Subjective:     Encounter Date:12/16/2022      Patient ID: Erin Bailey is a 57 y.o. female.    Chief Complaint:  Heart murmur, shortness of breath    HPI:   57 y.o. female with diabetes and hypertension who presents for initial evaluation of cardiac murmur.  Patient was in her usual state of health until about 8 months ago.  At that time she noticed worsening dyspnea on exertion.  She finds that going up stairs she feels extremely winded and is and panting for air.  She now feels very winded with walking and very fatigued with minimal activities.  She followed up with her primary care physician who heard a murmur and referred her here.  She notes a family history of aortic valve replacement.  Her mother was diagnosed with aortic stenosis in her 50s and underwent aortic valve replacement at that time.  She subsequently had bioprosthetic aortic valve stenosis and had to have a repeat operation.  She also notes that her maternal grandfather had aortic stenosis however this was in his 80s.  She does not remember being told that she has a family history of a bicuspid aortic valve.  She denies chest pain, palpitations, lower extremity edema, orthopnea, PND.      The following portions of the patient's history were reviewed and updated as appropriate: allergies, current medications, past family history, past medical history, past social history, past surgical history and problem list.     REVIEW OF SYSTEMS:   All systems reviewed.  Pertinent positives identified in HPI.  All other systems are negative.    Past Medical History:   Diagnosis Date   • Allergic    • Diabetes mellitus (HCC)    • Hyperlipemia    • Hypertension    • Insomnia    • Knee injury     left   • Knee swelling June 2021   • Migraine    • Obesity    • Restless leg        Family History   Problem Relation Age of Onset   • Prostate cancer Father    • Cancer Father         Prostate - clear   • Colon cancer Sister    • Cancer Sister         Rectal  "Clear   • Diabetes Maternal Grandmother        Social History     Socioeconomic History   • Marital status:    Tobacco Use   • Smoking status: Never   • Smokeless tobacco: Never   Vaping Use   • Vaping Use: Never used   Substance and Sexual Activity   • Alcohol use: Yes     Alcohol/week: 1.0 standard drink     Types: 1 Glasses of wine per week     Comment: socially/caffeine use   • Drug use: Never   • Sexual activity: Not Currently     Partners: Male     Birth control/protection: Post-menopausal       Allergies   Allergen Reactions   • Sulfa Antibiotics Hives and Itching       Past Surgical History:   Procedure Laterality Date   • BREAST BIOPSY Bilateral 2015    with excision/ atypical cells  bilateral   • BREAST EXCISIONAL BIOPSY Bilateral 2015    \"\"\"\" after bx infection left breast (open wound care)   • BREAST SURGERY     • CHOLECYSTECTOMY     • CHOLECYSTECTOMY     • HERNIA REPAIR     • HERNIA REPAIR     • TONSILLECTOMY           ECG 12 Lead    Date/Time: 12/16/2022 1:54 PM  Performed by: Jose Soto MD  Authorized by: Jose Soto MD   Comparison: not compared with previous ECG   Previous ECG: no previous ECG available  Rhythm: sinus rhythm  Rate: normal  Conduction: conduction normal  ST Segments: ST segments normal  T Waves: T waves normal  QRS axis: normal    Clinical impression: normal ECG               Objective:         Vitals:    12/16/22 1309   BP: 132/82   Pulse: 96       PHYSICAL EXAM:  GEN: well appearing, in NAD   HEENT: NCAT, EOMI, moist mucus membranes   Respiratory: CTAB, no wheezes, rales or rhonchi  CV: normal rate, regular rhythm, normal S1, S2, 2/6 high pitched systolic murmur heard throughout the precordium, rubs, gallops, +2 radial pulses b/l  GI: soft, nontender, nondistended  MSK: no edema  Skin: no rash, warm, dry  Heme/Lymph: no bruising or bleeding  Psych: organized thought, normal behavior and affect  Neuro: Alert and Oriented x 3, grossly normal motor function        Assessment: "         (R06.09) Dyspnea on exertion - Plan: Adult Transthoracic Echo Complete w/ Color, Spectral and Contrast if Necessary Per Protocol    (R01.1) Systolic murmur - Plan: Adult Transthoracic Echo Complete w/ Color, Spectral and Contrast if Necessary Per Protocol    57-year-old woman with hypertension and diabetes who presents for initial evaluation systolic murmur in the setting of dyspnea on exertion.       Plan:       #Dyspnea on exertion/systolic murmur  Patient with significant, progressive dyspnea exertion over the past year.  On exam she has a high-pitched 2 out of 6 systolic murmur heard throughout the precordium.  She has a significant family history of valvular disease with her mother having aortic stenosis at an early age.  Differential includes valvular heart disease versus HFpEF/HFrEF versus coronary artery disease.  We discussed further evaluation with an echocardiogram and discussed further steps if echo is normal or abnormal.  If echo is normal, will proceed with nuclear stress test given her inability to walk due to prior knee replacement surgery.  If echo is abnormal and reveals severe aortic stenosis and we would likely proceed with left heart catheterization define coronary anatomy in preparation for possible intervention.  Patient is agreeable to plan  -Echocardiogram      Essence Neal, thank you very much for referring this kind patient to me. Please call me with any questions or concerns. I will see the patient again in the office pending her testing results.         Jose Soto MD  12/16/22  [facility]    Outpatient Encounter Medications as of 12/16/2022   Medication Sig Dispense Refill   • atorvastatin (LIPITOR) 20 MG tablet Take 1 tablet by mouth Every Night. 90 tablet 3   • Cetirizine HCl 10 MG capsule      • Diclofenac Sodium (VOLTAREN) 1 % gel gel Apply 4 g topically to the appropriate area as directed 4 (Four) Times a Day As Needed (knee pain). 350 g 1   • dicyclomine  (BENTYL) 20 MG tablet Take 1 tablet by mouth Every 6 (Six) Hours As Needed (diarrhea). 120 tablet 0   • fluticasone (FLONASE) 50 MCG/ACT nasal spray      • nystatin (MYCOSTATIN) 585485 UNIT/GM powder Apply  topically to the appropriate area as directed 2 (Two) Times a Day. 60 g 2   • olmesartan (BENICAR) 40 MG tablet Take 1 tablet by mouth Daily. 90 tablet 3   • Omega-3 Fatty Acids (fish oil) 1000 MG capsule capsule Take  by mouth Daily With Breakfast.     • pioglitazone (ACTOS) 15 MG tablet Take 1 tablet by mouth Daily. 90 tablet 3   • QUEtiapine XR (SEROquel XR) 150 MG 24 hr tablet Take 1 tablet by mouth Every Night. 90 tablet 3   • rOPINIRole (Requip) 0.5 MG tablet Take 1 tablet by mouth Every Night. TAKE WITH ROPINIROLE 1 MG TAB 90 tablet 3   • rOPINIRole (REQUIP) 1 MG tablet Take 1 tablet by mouth Every Night. Take 1 hour before bedtime 90 tablet 3   • SUMAtriptan (IMITREX) 50 MG tablet      • tiZANidine (ZANAFLEX) 4 MG tablet TAKE 1 TABLET BY MOUTH AT NIGHT AS NEEDED FOR MUSCLE SPASMS 30 tablet 1   • [DISCONTINUED] olopatadine (PATADAY) 0.2 % solution ophthalmic solution Administer 1 drop into the left eye Daily. 2.5 mL 0     No facility-administered encounter medications on file as of 12/16/2022.

## 2023-01-10 ENCOUNTER — HOSPITAL ENCOUNTER (OUTPATIENT)
Dept: CARDIOLOGY | Facility: HOSPITAL | Age: 58
Discharge: HOME | End: 2023-01-10
Admitting: STUDENT IN AN ORGANIZED HEALTH CARE EDUCATION/TRAINING PROGRAM
Payer: COMMERCIAL

## 2023-01-10 VITALS
HEIGHT: 66 IN | DIASTOLIC BLOOD PRESSURE: 75 MMHG | HEART RATE: 63 BPM | SYSTOLIC BLOOD PRESSURE: 140 MMHG | WEIGHT: 293 LBS | BODY MASS INDEX: 47.09 KG/M2

## 2023-01-10 DIAGNOSIS — R01.1 SYSTOLIC MURMUR: ICD-10-CM

## 2023-01-10 DIAGNOSIS — R06.09 DYSPNEA ON EXERTION: ICD-10-CM

## 2023-01-10 LAB
AORTIC DIMENSIONLESS INDEX: 0.6 (DI)
ASCENDING AORTA: 2.6 CM
BH CV ECHO MEAS - ACS: 1.43 CM
BH CV ECHO MEAS - AO MAX PG: 29.8 MMHG
BH CV ECHO MEAS - AO MEAN PG: 15.5 MMHG
BH CV ECHO MEAS - AO ROOT DIAM: 3 CM
BH CV ECHO MEAS - AO V2 MAX: 273 CM/SEC
BH CV ECHO MEAS - AO V2 VTI: 57.6 CM
BH CV ECHO MEAS - AVA(I,D): 2.27 CM2
BH CV ECHO MEAS - EDV(CUBED): 94.8 ML
BH CV ECHO MEAS - EDV(MOD-SP2): 108 ML
BH CV ECHO MEAS - EDV(MOD-SP4): 110 ML
BH CV ECHO MEAS - EF(MOD-BP): 55.1 %
BH CV ECHO MEAS - EF(MOD-SP2): 54.6 %
BH CV ECHO MEAS - EF(MOD-SP4): 55.5 %
BH CV ECHO MEAS - ESV(CUBED): 15.2 ML
BH CV ECHO MEAS - ESV(MOD-SP2): 49 ML
BH CV ECHO MEAS - ESV(MOD-SP4): 49 ML
BH CV ECHO MEAS - FS: 45.7 %
BH CV ECHO MEAS - IVS/LVPW: 0.97 CM
BH CV ECHO MEAS - IVSD: 1.09 CM
BH CV ECHO MEAS - LAT PEAK E' VEL: 7.6 CM/SEC
BH CV ECHO MEAS - LV DIASTOLIC VOL/BSA (35-75): 43.3 CM2
BH CV ECHO MEAS - LV MASS(C)D: 180.2 GRAMS
BH CV ECHO MEAS - LV MAX PG: 11.4 MMHG
BH CV ECHO MEAS - LV MEAN PG: 5.6 MMHG
BH CV ECHO MEAS - LV SYSTOLIC VOL/BSA (12-30): 19.3 CM2
BH CV ECHO MEAS - LV V1 MAX: 168.8 CM/SEC
BH CV ECHO MEAS - LV V1 VTI: 36.2 CM
BH CV ECHO MEAS - LVIDD: 4.6 CM
BH CV ECHO MEAS - LVIDS: 2.47 CM
BH CV ECHO MEAS - LVOT AREA: 3.6 CM2
BH CV ECHO MEAS - LVOT DIAM: 2.15 CM
BH CV ECHO MEAS - LVPWD: 1.13 CM
BH CV ECHO MEAS - MED PEAK E' VEL: 9.2 CM/SEC
BH CV ECHO MEAS - MV A DUR: 0.13 SEC
BH CV ECHO MEAS - MV A MAX VEL: 119.7 CM/SEC
BH CV ECHO MEAS - MV DEC SLOPE: 1163 CM/SEC2
BH CV ECHO MEAS - MV DEC TIME: 0.16 MSEC
BH CV ECHO MEAS - MV E MAX VEL: 112 CM/SEC
BH CV ECHO MEAS - MV E/A: 0.94
BH CV ECHO MEAS - MV MAX PG: 7.2 MMHG
BH CV ECHO MEAS - MV MEAN PG: 4.3 MMHG
BH CV ECHO MEAS - MV P1/2T: 34 MSEC
BH CV ECHO MEAS - MV V2 VTI: 37.7 CM
BH CV ECHO MEAS - MVA(P1/2T): 6.5 CM2
BH CV ECHO MEAS - MVA(VTI): 3.5 CM2
BH CV ECHO MEAS - PA ACC TIME: 0.13 SEC
BH CV ECHO MEAS - PA PR(ACCEL): 20.4 MMHG
BH CV ECHO MEAS - PA V2 MAX: 122.2 CM/SEC
BH CV ECHO MEAS - PULM A REVS DUR: 0.18 SEC
BH CV ECHO MEAS - PULM A REVS VEL: 29 CM/SEC
BH CV ECHO MEAS - PULM DIAS VEL: 46.7 CM/SEC
BH CV ECHO MEAS - PULM S/D: 1.18
BH CV ECHO MEAS - PULM SYS VEL: 55.2 CM/SEC
BH CV ECHO MEAS - QP/QS: 0.53
BH CV ECHO MEAS - RAP SYSTOLE: 8 MMHG
BH CV ECHO MEAS - RV MAX PG: 4.8 MMHG
BH CV ECHO MEAS - RV V1 MAX: 109.6 CM/SEC
BH CV ECHO MEAS - RV V1 VTI: 21.4 CM
BH CV ECHO MEAS - RVOT DIAM: 2.02 CM
BH CV ECHO MEAS - RVSP: 38.8 MMHG
BH CV ECHO MEAS - SI(MOD-SP2): 23.2 ML/M2
BH CV ECHO MEAS - SI(MOD-SP4): 24 ML/M2
BH CV ECHO MEAS - SV(LVOT): 130.8 ML
BH CV ECHO MEAS - SV(MOD-SP2): 59 ML
BH CV ECHO MEAS - SV(MOD-SP4): 61 ML
BH CV ECHO MEAS - SV(RVOT): 68.9 ML
BH CV ECHO MEAS - TAPSE (>1.6): 2.8 CM
BH CV ECHO MEAS - TR MAX PG: 30.8 MMHG
BH CV ECHO MEAS - TR MAX VEL: 277.5 CM/SEC
BH CV ECHO MEASUREMENTS AVERAGE E/E' RATIO: 13.33
BH CV XLRA - RV BASE: 3.4 CM
BH CV XLRA - RV LENGTH: 7.4 CM
BH CV XLRA - RV MID: 3.6 CM
BH CV XLRA - TDI S': 15.4 CM/SEC
MAXIMAL PREDICTED HEART RATE: 162 BPM
SINUS: 3.2 CM
STJ: 2.39 CM
STRESS TARGET HR: 138 BPM

## 2023-01-10 PROCEDURE — 93306 TTE W/DOPPLER COMPLETE: CPT | Performed by: INTERNAL MEDICINE

## 2023-01-10 PROCEDURE — 93306 TTE W/DOPPLER COMPLETE: CPT

## 2023-01-11 DIAGNOSIS — R06.09 DYSPNEA ON EXERTION: Primary | ICD-10-CM

## 2023-01-11 NOTE — PROGRESS NOTES
Called and left voicemail regarding patient's echo results.  Subsequently sent [employer] message indicating that we will obtain nuclear stress test for further evaluation of her dyspnea. Dr. Cole [phone]

## 2023-01-26 ENCOUNTER — TELEPHONE (OUTPATIENT)
Dept: CARDIOLOGY | Facility: CLINIC | Age: 58
End: 2023-01-26
Payer: COMMERCIAL

## 2023-01-26 ENCOUNTER — HOSPITAL ENCOUNTER (OUTPATIENT)
Dept: CARDIOLOGY | Facility: HOSPITAL | Age: 58
Discharge: HOME | End: 2023-01-26
Admitting: STUDENT IN AN ORGANIZED HEALTH CARE EDUCATION/TRAINING PROGRAM
Payer: COMMERCIAL

## 2023-01-26 VITALS — HEIGHT: 66 IN | WEIGHT: 293 LBS | BODY MASS INDEX: 47.09 KG/M2

## 2023-01-26 DIAGNOSIS — R06.09 DYSPNEA ON EXERTION: ICD-10-CM

## 2023-01-26 LAB
BH CV NUCLEAR PRIOR STUDY: 2
BH CV REST NUCLEAR ISOTOPE DOSE: 60 MCI
BH CV STRESS BP STAGE 1: NORMAL
BH CV STRESS COMMENTS STAGE 1: NORMAL
BH CV STRESS DOSE REGADENOSON STAGE 1: 0.4
BH CV STRESS DURATION MIN STAGE 1: 0
BH CV STRESS DURATION SEC STAGE 1: 10
BH CV STRESS HR STAGE 1: 103
BH CV STRESS NUCLEAR ISOTOPE DOSE: 60 MCI
BH CV STRESS PROTOCOL 1: NORMAL
BH CV STRESS RECOVERY BP: NORMAL MMHG
BH CV STRESS RECOVERY HR: 94 BPM
BH CV STRESS STAGE 1: 1
LV EF NUC BP: 71 %
MAXIMAL PREDICTED HEART RATE: 162 BPM
PERCENT MAX PREDICTED HR: 63.58 %
STRESS BASELINE BP: NORMAL MMHG
STRESS BASELINE HR: 92 BPM
STRESS PERCENT HR: 75 %
STRESS POST EXERCISE DUR SEC: 10 SEC
STRESS POST PEAK BP: NORMAL MMHG
STRESS POST PEAK HR: 103 BPM
STRESS TARGET HR: 138 BPM

## 2023-01-26 PROCEDURE — A9555 RB82 RUBIDIUM: HCPCS | Performed by: STUDENT IN AN ORGANIZED HEALTH CARE EDUCATION/TRAINING PROGRAM

## 2023-01-26 PROCEDURE — 78492 MYOCRD IMG PET MLT RST&STRS: CPT | Performed by: INTERNAL MEDICINE

## 2023-01-26 PROCEDURE — 93016 CV STRESS TEST SUPVJ ONLY: CPT | Performed by: INTERNAL MEDICINE

## 2023-01-26 PROCEDURE — 93017 CV STRESS TEST TRACING ONLY: CPT

## 2023-01-26 PROCEDURE — 0 RUBIDIUM CHLORIDE: Performed by: STUDENT IN AN ORGANIZED HEALTH CARE EDUCATION/TRAINING PROGRAM

## 2023-01-26 PROCEDURE — 93018 CV STRESS TEST I&R ONLY: CPT | Performed by: INTERNAL MEDICINE

## 2023-01-26 PROCEDURE — 25010000002 REGADENOSON 0.4 MG/5ML SOLUTION: Performed by: STUDENT IN AN ORGANIZED HEALTH CARE EDUCATION/TRAINING PROGRAM

## 2023-01-26 PROCEDURE — 78492 MYOCRD IMG PET MLT RST&STRS: CPT

## 2023-01-26 RX ADMIN — REGADENOSON 0.4 MG: 0.08 INJECTION, SOLUTION INTRAVENOUS at 13:00

## 2023-01-26 NOTE — TELEPHONE ENCOUNTER
----- Message from ETHEL Leonard sent at 1/26/2023  2:23 PM EST -----  Please let patient know her stress test showed no evidence of ischemia and is considered a low risk study.

## 2023-01-26 NOTE — TELEPHONE ENCOUNTER
Reviewed results with Erin Bailey and patient verbalized understanding of results.    Dr. Culver,    Patient wanted to also let you know that her mother was diagnosed with pulmonary hypertension.  Patient is also asking what the next steps to evaluate her symptoms?    Thank you,  Mariely VERDUGO RN  Triage Nurse [facility]

## 2023-01-26 NOTE — PROGRESS NOTES
Please see telephone note.    Thank you,  Mariely VERDUGO RN  Triage Nurse [facility]

## 2023-02-02 ENCOUNTER — TELEPHONE (OUTPATIENT)
Dept: CARDIOLOGY | Facility: CLINIC | Age: 58
End: 2023-02-02

## 2023-02-02 NOTE — TELEPHONE ENCOUNTER
Caller: Erin Bailey    Relationship: Self    Best call back number: [phone]    What is the best time to reach you: ANYTIME    Who are you requesting to speak with (clinical staff, provider,  specific staff member):         What was the call regarding: PT RECEIVED CALL THAT HER [facility] PAPERWORK WAS FINISHED. PLEASE FAX TO # [phone]. COULD YOU ALSO MAIL HER A COPY FOR HER RECORDS. PLEASE CALL OR LEAVE VM, SO PT KNOWS IT IS COMPLETED.TODAY IS LAST DAY TO TURN IN PAPER WORK    Do you require a callback: YES

## 2023-02-08 NOTE — TELEPHONE ENCOUNTER
Pt is calling about her [facility] paper work. It needs to be Fax ASAP to [phone].    I spoke with . He stated that this was filled out last week. I do not see any documentation that the [facility] paper work was faxed and I don't see it scanned under medica.    Can you please look at the office and track the paper work?

## 2023-02-13 DIAGNOSIS — G47.00 INSOMNIA, UNSPECIFIED TYPE: ICD-10-CM

## 2023-02-13 RX ORDER — QUETIAPINE 150 MG/1
150 TABLET, FILM COATED, EXTENDED RELEASE ORAL NIGHTLY
Qty: 90 TABLET | Refills: 3 | Status: SHIPPED | OUTPATIENT
Start: 2023-02-13

## 2023-04-08 ENCOUNTER — APPOINTMENT (OUTPATIENT)
Dept: GENERAL RADIOLOGY | Facility: HOSPITAL | Age: 58
End: 2023-04-08
Payer: COMMERCIAL

## 2023-04-08 ENCOUNTER — HOSPITAL ENCOUNTER (EMERGENCY)
Facility: HOSPITAL | Age: 58
Discharge: HOME | End: 2023-04-08
Attending: EMERGENCY MEDICINE | Admitting: EMERGENCY MEDICINE
Payer: COMMERCIAL

## 2023-04-08 VITALS
HEART RATE: 94 BPM | TEMPERATURE: 98.1 F | HEIGHT: 67 IN | WEIGHT: 293 LBS | OXYGEN SATURATION: 98 % | DIASTOLIC BLOOD PRESSURE: 77 MMHG | BODY MASS INDEX: 45.99 KG/M2 | SYSTOLIC BLOOD PRESSURE: 128 MMHG | RESPIRATION RATE: 18 BRPM

## 2023-04-08 DIAGNOSIS — M23.92 ACUTE INTERNAL DERANGEMENT OF LEFT KNEE: Primary | ICD-10-CM

## 2023-04-08 PROCEDURE — 99283 EMERGENCY DEPT VISIT LOW MDM: CPT

## 2023-04-08 PROCEDURE — 73560 X-RAY EXAM OF KNEE 1 OR 2: CPT

## 2023-04-08 RX ORDER — TRAMADOL HYDROCHLORIDE 50 MG/1
50 TABLET ORAL EVERY 8 HOURS PRN
Qty: 10 TABLET | Refills: 0 | Status: SHIPPED | OUTPATIENT
Start: 2023-04-08

## 2023-04-08 RX ORDER — TRAMADOL HYDROCHLORIDE 50 MG/1
50 TABLET ORAL EVERY 8 HOURS PRN
Qty: 10 TABLET | Refills: 0 | Status: SHIPPED | OUTPATIENT
Start: 2023-04-08 | End: 2023-04-08 | Stop reason: SDUPTHER

## 2023-04-08 NOTE — ED PROVIDER NOTES
" EMERGENCY DEPARTMENT ENCOUNTER    Room Number:  40/40  Date seen:  4/8/2023  PCP: Provider, No Known  Historian: Patient      HPI:  Chief Complaint: Left knee pain    A complete HPI/ROS/PMH/PSH/SH/FH are unobtainable due to: N/A    Context: Erin Bailey is a 57 y.o. female who presents to the ED c/o left knee pain.  She has had problems with her left and right knee off and on over the years.  Left knee has been feeling like it is going to \"give out\" for the past week and she has had a lot of pain anteriorly and medially.  She has not fallen.  She does not recall any recent injuries.  In the past, she had cortisone injections with some relief.        Additional sources:  - Discussed/ obtained information from independent historians: No-she is unaccompanied today.    - External (non-ED) record review: She has a history of hypertension, hyperlipidemia, diabetes, migraines and obesity.  No recent ER visits or hospitalizations at [facility].    - Chronic or social conditions impacting care: Multiple medical problems.      PAST MEDICAL HISTORY  Active Ambulatory Problems     Diagnosis Date Noted   • Allergies 06/04/2020   • Diabetic neuropathy 06/04/2020   • High blood pressure 01/01/2005   • Insomnia 06/04/2020   • Migraines 06/04/2020   • Restless leg syndrome 01/01/2018   • Type 2 diabetes mellitus without complication, without long-term current use of insulin 01/01/2015   • Abnormal uterine bleeding (AUB) 10/13/2020   • Hyperlipemia    • Rash 07/15/2021   • Wellness examination 07/15/2021     Resolved Ambulatory Problems     Diagnosis Date Noted   • No Resolved Ambulatory Problems     Past Medical History:   Diagnosis Date   • Allergic    • Diabetes mellitus    • Hypertension    • Knee injury    • Knee swelling June 2021   • Migraine    • Obesity    • Restless leg          PAST SURGICAL HISTORY  Past Surgical History:   Procedure Laterality Date   • BREAST BIOPSY Bilateral 2015    with excision/ " "atypical cells  bilateral   • BREAST EXCISIONAL BIOPSY Bilateral 2015    \"\"\"\" after bx infection left breast (open wound care)   • BREAST SURGERY     • CHOLECYSTECTOMY     • CHOLECYSTECTOMY     • HERNIA REPAIR     • HERNIA REPAIR     • TONSILLECTOMY           FAMILY HISTORY  Family History   Problem Relation Age of Onset   • Prostate cancer Father    • Cancer Father         Prostate - clear   • Colon cancer Sister    • Cancer Sister         Rectal Clear   • Diabetes Maternal Grandmother          SOCIAL HISTORY  Social History     Socioeconomic History   • Marital status:    Tobacco Use   • Smoking status: Never   • Smokeless tobacco: Never   Vaping Use   • Vaping Use: Never used   Substance and Sexual Activity   • Alcohol use: Yes     Alcohol/week: 1.0 standard drink     Types: 1 Glasses of wine per week     Comment: socially/caffeine use   • Drug use: Never   • Sexual activity: Not Currently     Partners: Male     Birth control/protection: Post-menopausal         ALLERGIES  Sulfa antibiotics        REVIEW OF SYSTEMS  Review of Systems   Constitutional: Negative for chills and fever.   Musculoskeletal: Positive for gait problem.        Left knee pain   Skin: Negative for wound.            PHYSICAL EXAM  ED Triage Vitals   Temp Heart Rate Resp BP SpO2   04/08/23 1624 04/08/23 1624 04/08/23 1624 04/08/23 1640 04/08/23 1624   98.1 °F (36.7 °C) 117 18 128/77 98 %      Temp src Heart Rate Source Patient Position BP Location FiO2 (%)   04/08/23 1624 04/08/23 1624 04/08/23 1640 04/08/23 1640 --   Tympanic Monitor Sitting Left arm        Physical Exam      Physical Exam   Constitutional: Pt. is oriented to person, place, and time.  She is well-developed, well-nourished, and in no distress.    HENT: Normocephalic and atraumatic. Pupils are equal, round, and reactive to light.   Neck: Normal range of motion.   Pulmonary/Chest: Effort normal, no respiratory distress.   Abdominal: Morbidly obese  Musculoskeletal: Left " lower extremity-there is mild tenderness over the patella and medial right joint.  Obesity limits exam of the knee.  Calf is soft.  No overlying skin changes.  Dorsalis pedis and posterior tibial pulses are easily palpable.    Neurological: She is alert and oriented to person, place, and time.  She has no focal neurologic deficits.  Skin: Skin over the left knee and lower extremity is warm and dry.   Psychiatric: Mood, affect normal.  She is pleasant and cooperative.  Nursing note and vitals reviewed.            LAB RESULTS  No results found for this or any previous visit (from the past 24 hour(s)).    Ordered the above labs and reviewed the results.        RADIOLOGY  XR Knee 1 or 2 View Left    Result Date: 4/8/2023  LEFT KNEE  HISTORY: Knee pain.  FINDINGS: AP and lateral views of the knee were obtained. The study is limited by patient positioning and by the patient's body habitus. There is a moderate loss of joint space involving the medial compartment. Mild-to-moderate patellofemoral degenerative disease is appreciated. No obvious fracture is identified. Further evaluation could be performed with a CT examination of the knee if a fracture is suspected. Otherwise, further evaluation could be performed with MRI examination of the knee.        Ordered the above noted radiological studies. Reviewed by me in PACS.        PROCEDURES  Procedures      MEDICATIONS GIVEN IN ER  Medications - No data to display          MEDICAL DECISION MAKING, PROGRESS, and CONSULTS    All labs have been independently reviewed by me.  All radiology studies have been reviewed by me and discussed with radiologist dictating the report.   EKG's independently viewed and interpreted by me.  Discussion below represents my analysis of pertinent findings related to patient's condition, differential diagnosis, treatment plan and final disposition.      Orders placed during this visit:  Orders Placed This Encounter   Procedures   • Walker   • XR  Knee 1 or 2 View Left   • 6 inch Ace wrap x2 to left knee  Misc Nursing Order (Specify)       Additional orders considered but not ordered:  N/A    Differential diagnosis:  Sprain, strain, effusion, fracture      Independent interpretation of labs, radiology studies, and discussions with consultants:  ED Course as of 04/08/23 1814   Sat Apr 08, 2023   1733 2 views of the left knee were independently visualized by me and discussed with/interpreted by Dr. Pena (radiology)-there is loss of joint space involving the medial compartment with mild/moderate patellofemoral degenerative disease.  No fractures.  For official interpretation, see dictated report. [WC]   1751 Patient will be provided with a walker at discharge.  Also will Ace wrap the knee.  Voltaren and tramadol prescribed at discharge.  Follow-up with orthopedics as an outpatient. [WC]      ED Course User Index  [WC] Maycol Mancuso MD              Appropriate PPE was worn by myself and the patient throughout our entire interaction.    DIAGNOSIS  Final diagnoses:   Acute internal derangement of left knee         DISPOSITION  Discharged            Latest Documented Vital Signs:  As of 18:14 EDT  BP- 128/77 HR- 94 Temp- 98.1 °F (36.7 °C) (Tympanic) O2 sat- 98%        --    Please note that portions of this were completed with a voice recognition program.       Note Disclaimer: At [facility], we believe that sharing information builds trust and better relationships. You are receiving this note because you are receiving care at [facility] or recently visited. It is possible you will see health information before a provider has talked with you about it. This kind of information can be easy to misunderstand. To help you fully understand what it means for your health, we urge you to discuss this note with your provider.           Maycol Mancuso MD  04/08/23 1814

## 2023-04-08 NOTE — ED TRIAGE NOTES
"Pt c/o left knee pain that started yesterday. Reports over the last week it felt like it was \"trying to give out\". Denies recent injury    This RN wore mask and goggles during time of contact    "

## 2023-05-11 ENCOUNTER — TELEPHONE (OUTPATIENT)
Dept: FAMILY MEDICINE CLINIC | Facility: CLINIC | Age: 58
End: 2023-05-11
Payer: COMMERCIAL

## 2023-05-11 NOTE — TELEPHONE ENCOUNTER
Patient was with Essence Neal, but did not reestablish after she left. Patient needs [facility] paperwork renewed and says nothing has changed so all of the information Essence included just needs to be faxed in again. [facility] paperwork is due in 6 days. Please advise

## 2023-05-12 ENCOUNTER — OFFICE VISIT (OUTPATIENT)
Dept: FAMILY MEDICINE CLINIC | Facility: CLINIC | Age: 58
End: 2023-05-12
Payer: COMMERCIAL

## 2023-05-12 DIAGNOSIS — G43.109 MIGRAINE WITH AURA AND WITHOUT STATUS MIGRAINOSUS, NOT INTRACTABLE: Primary | ICD-10-CM

## 2023-05-12 NOTE — PROGRESS NOTES
"This was an audio and video enabled telemedicine encounter.  Patient location: home address as in chart  Physician location: [place] [id number]   Start time: 9:04 am  End time: 9:11 am  Total time of encounter: 7 mins    You have chosen to receive care through a telephone visit. Do you consent to use a telephone visit for your medical care today? Yes      Chief Complaint  Migraines     Subjective        Erin Bailey presents to [facility]  History of Present Illness     Needs [place] paperwork completed for migraines. Has had them for years. Migraines are very stable. Gets a few per month. Takes sumatriptan when she has a migraine and it helped. Allergy shots did help with the migraine frequency, and menopause helped with the frequency.     Objective   Vital Signs:  There were no vitals taken for this visit.  Estimated body mass index is 51.69 kg/m² as calculated from the following:    Height as of 4/8/23: 170.2 cm (67\").    Weight as of 4/8/23: 150 kg (330 lb).             Physical Exam  Constitutional:       Appearance: Normal appearance.   HENT:      Head: Normocephalic and atraumatic.      Nose: Nose normal.   Eyes:      Extraocular Movements: Extraocular movements intact.      Conjunctiva/sclera: Conjunctivae normal.   Pulmonary:      Effort: Pulmonary effort is normal.   Neurological:      Mental Status: She is alert.   Psychiatric:         Mood and Affect: Mood normal.         Behavior: Behavior normal.        Result Review :                   Assessment and Plan   Diagnoses and all orders for this visit:    1. Migraine with aura and without status migrainosus, not intractable (Primary)             Follow Up   Return in about 1 month (around 6/12/2023).  Patient was given instructions and counseling regarding her condition or for health maintenance advice. Please see specific information pulled into the AVS if appropriate.       "

## 2023-05-12 NOTE — TELEPHONE ENCOUNTER
Spoke with patient and  has agreed to take her on as a patient is scheduled for a telehealth visit for her FMLA

## 2023-07-28 DIAGNOSIS — J30.2 SEASONAL ALLERGIES: ICD-10-CM

## 2023-07-31 RX ORDER — FLUTICASONE PROPIONATE 50 MCG
SPRAY, SUSPENSION (ML) NASAL
Qty: 16 ML | Refills: 3 | Status: SHIPPED | OUTPATIENT
Start: 2023-07-31

## 2023-09-14 DIAGNOSIS — G25.81 RESTLESS LEG SYNDROME: ICD-10-CM

## 2023-09-14 RX ORDER — ROPINIROLE 1 MG/1
2 TABLET, FILM COATED ORAL NIGHTLY
Qty: 60 TABLET | Refills: 0 | Status: SHIPPED | OUTPATIENT
Start: 2023-09-14

## 2023-09-26 DIAGNOSIS — K21.9 GASTROESOPHAGEAL REFLUX DISEASE, UNSPECIFIED WHETHER ESOPHAGITIS PRESENT: ICD-10-CM

## 2023-09-27 RX ORDER — PANTOPRAZOLE SODIUM 40 MG/1
TABLET, DELAYED RELEASE ORAL
Qty: 90 TABLET | Refills: 0 | Status: SHIPPED | OUTPATIENT
Start: 2023-09-27

## 2023-09-27 RX ORDER — CEFDINIR 300 MG/1
300 CAPSULE ORAL 2 TIMES DAILY
COMMUNITY
Start: 2023-09-21

## 2023-09-27 NOTE — TELEPHONE ENCOUNTER
LOV             7/6/2023   NOV            Return in about 3 months (around 10/6/2023).    Last RF       7/6/23  Protocol      Met     DELLA Belle/ADELA

## 2023-10-16 DIAGNOSIS — G25.81 RESTLESS LEG SYNDROME: ICD-10-CM

## 2023-10-16 RX ORDER — ROPINIROLE 1 MG/1
2 TABLET, FILM COATED ORAL NIGHTLY
Qty: 60 TABLET | Refills: 0 | Status: SHIPPED | OUTPATIENT
Start: 2023-10-16

## 2023-10-24 DIAGNOSIS — I10 ESSENTIAL HYPERTENSION: ICD-10-CM

## 2023-10-24 DIAGNOSIS — E11.9 TYPE 2 DIABETES MELLITUS WITHOUT COMPLICATION, WITHOUT LONG-TERM CURRENT USE OF INSULIN: ICD-10-CM

## 2023-10-24 RX ORDER — OLMESARTAN MEDOXOMIL 40 MG/1
40 TABLET ORAL DAILY
Qty: 30 TABLET | Refills: 0 | Status: SHIPPED | OUTPATIENT
Start: 2023-10-24

## 2023-10-24 RX ORDER — PIOGLITAZONEHYDROCHLORIDE 15 MG/1
15 TABLET ORAL DAILY
Qty: 30 TABLET | Refills: 0 | Status: SHIPPED | OUTPATIENT
Start: 2023-10-24

## 2023-10-24 RX ORDER — BENZONATATE 200 MG/1
200 CAPSULE ORAL 3 TIMES DAILY PRN
COMMUNITY
Start: 2023-10-03

## 2023-10-24 RX ORDER — IPRATROPIUM BROMIDE 21 UG/1
2 SPRAY, METERED NASAL 3 TIMES DAILY
COMMUNITY
Start: 2023-10-03

## 2023-10-24 NOTE — TELEPHONE ENCOUNTER
LOV             7/6/2023   NOV             3 months (around 10/6/2023).   Last RF       11/15/22  Protocol       not met    Sent a pt message to sched appt and we will refill 1 time    DELLA Belle/ADELA

## 2023-12-19 ENCOUNTER — OFFICE VISIT (OUTPATIENT)
Dept: FAMILY MEDICINE CLINIC | Facility: CLINIC | Age: 58
End: 2023-12-19
Payer: COMMERCIAL

## 2023-12-19 VITALS
HEART RATE: 96 BPM | SYSTOLIC BLOOD PRESSURE: 121 MMHG | DIASTOLIC BLOOD PRESSURE: 75 MMHG | OXYGEN SATURATION: 98 % | TEMPERATURE: 96.1 F | BODY MASS INDEX: 56.38 KG/M2 | WEIGHT: 293 LBS

## 2023-12-19 DIAGNOSIS — E11.9 TYPE 2 DIABETES MELLITUS WITHOUT COMPLICATION, WITHOUT LONG-TERM CURRENT USE OF INSULIN: Primary | ICD-10-CM

## 2023-12-19 DIAGNOSIS — G43.109 MIGRAINE WITH AURA AND WITHOUT STATUS MIGRAINOSUS, NOT INTRACTABLE: ICD-10-CM

## 2023-12-19 DIAGNOSIS — I10 PRIMARY HYPERTENSION: ICD-10-CM

## 2023-12-19 DIAGNOSIS — G47.00 INSOMNIA, UNSPECIFIED TYPE: ICD-10-CM

## 2023-12-19 DIAGNOSIS — M17.0 PRIMARY OSTEOARTHRITIS OF BOTH KNEES: ICD-10-CM

## 2023-12-19 DIAGNOSIS — G25.81 RESTLESS LEG SYNDROME: ICD-10-CM

## 2023-12-19 DIAGNOSIS — E78.5 HYPERLIPIDEMIA, UNSPECIFIED HYPERLIPIDEMIA TYPE: ICD-10-CM

## 2023-12-19 DIAGNOSIS — R63.5 WEIGHT GAIN: ICD-10-CM

## 2023-12-19 RX ORDER — ONDANSETRON 4 MG/1
4 TABLET, ORALLY DISINTEGRATING ORAL EVERY 8 HOURS PRN
Qty: 12 TABLET | Refills: 0 | Status: SHIPPED | OUTPATIENT
Start: 2023-12-19

## 2023-12-19 RX ORDER — ROPINIROLE 1 MG/1
2 TABLET, FILM COATED ORAL NIGHTLY
Qty: 60 TABLET | Refills: 0 | Status: SHIPPED | OUTPATIENT
Start: 2023-12-19

## 2023-12-19 RX ORDER — QUETIAPINE 300 MG/1
300 TABLET, FILM COATED, EXTENDED RELEASE ORAL NIGHTLY
Qty: 90 TABLET | Refills: 1 | Status: SHIPPED | OUTPATIENT
Start: 2023-12-19

## 2023-12-19 RX ORDER — SUMATRIPTAN 50 MG/1
50 TABLET, FILM COATED ORAL ONCE AS NEEDED
Qty: 15 TABLET | Refills: 0 | Status: SHIPPED | OUTPATIENT
Start: 2023-12-19

## 2023-12-19 NOTE — PROGRESS NOTES
"Chief Complaint  Insomnia    Subjective        Erin Bailey presents to [facility] PR[facility]  History of Present Illness    Has been feeling well overall. Knees are killing her and left shoulder is causing lots of discomfort. Has more issues with weather in her left shoulder. Has been taking over the counter ibuprofen and taking Excedrin for her migraines. Was given oral diclofenac which tears her stomach up and she cannot take that. Has tried topical but struggles to put it on frequently enough. Has seen ortho and tried shots before which did help but she is not able to get in to see them before the holidays. Right knee is worse than left. Sugar has been well controlled. Would like to have injections in both of her knees today. Needs new [facility] paperwork completed. Has had some issues over the last several months with three episodes instead of two.   Has issues falling asleep. This is her biggest problem. Usually once asleep will be able to stay asleep. Takes 1-4 hours to fall asleep. Will sometimes read while laying in bed. Uses a reading device and adds blue blockers.     Objective   Vital Signs:  /75 (BP Location: Left arm, Patient Position: Sitting, Cuff Size: Large Adult)   Pulse 96   Temp 96.1 °F (35.6 °C)   Wt (!) 163 kg (360 lb)   SpO2 98%   BMI 56.38 kg/m²   Estimated body mass index is 56.38 kg/m² as calculated from the following:    Height as of 10/30/23: 170.2 cm (67\").    Weight as of this encounter: 163 kg (360 lb).               Physical Exam  Vitals and nursing note reviewed.   Constitutional:       General: She is not in acute distress.     Appearance: She is obese. She is not ill-appearing.   HENT:      Head: Normocephalic and atraumatic.      Nose: Nose normal.      Mouth/Throat:      Mouth: Mucous membranes are moist.   Eyes:      Extraocular Movements: Extraocular movements intact.      Conjunctiva/sclera: Conjunctivae normal.   Cardiovascular:      Rate and " Rhythm: Normal rate and regular rhythm.      Heart sounds: Normal heart sounds. No murmur heard.     No gallop.   Pulmonary:      Effort: Pulmonary effort is normal. No respiratory distress.      Breath sounds: Normal breath sounds. No stridor. No wheezing, rhonchi or rales.   Chest:      Chest wall: No tenderness.   Skin:     General: Skin is warm and dry.   Neurological:      General: No focal deficit present.      Mental Status: She is alert and oriented to person, place, and time. Mental status is at baseline.   Psychiatric:         Mood and Affect: Mood normal.         Behavior: Behavior normal.      Result Review :            Arthrocentesis    Date/Time: 12/19/2023 10:17 AM    Performed by: Rosy Whitman DO  Authorized by: Rosy Whitman DO  Indications: joint swelling and pain   Body area: knee  Joint: right knee  Local anesthesia used: yes    Anesthesia:  Local anesthesia used: yes  Local Anesthetic: co-phenylcaine spray    Sedation:  Patient sedated: no    Needle gauge: 21.  Aspirate amount: 0 mL  Patient tolerance: patient tolerated the procedure well with no immediate complications            Assessment and Plan   Diagnoses and all orders for this visit:    1. Type 2 diabetes mellitus without complication, without long-term current use of insulin (Primary)  -     Hemoglobin A1c  -     Tirzepatide (Mounjaro) 2.5 MG/0.5ML solution pen-injector; Inject 0.5 mL under the skin into the appropriate area as directed 1 (One) Time Per Week.  Dispense: 2 mL; Refill: 1    2. Restless leg syndrome  Comments:  Increase ropinirole tablet to 1.5 mg at night and addituionalk 0.5 mg tablet as needed if she wakes up with symptoms.  Orders:  -     rOPINIRole (REQUIP) 1 MG tablet; Take 2 tablets by mouth Every Night.  Dispense: 60 tablet; Refill: 0    3. Hyperlipidemia, unspecified hyperlipidemia type  -     Lipid panel    4. Primary hypertension  -     Comprehensive metabolic panel    5. Migraine with aura and without  status migrainosus, not intractable  -     SUMAtriptan (IMITREX) 50 MG tablet; Take 1 tablet by mouth 1 (One) Time As Needed for Migraine for up to 15 doses.  Dispense: 15 tablet; Refill: 0    6. Weight gain  -     ondansetron ODT (ZOFRAN-ODT) 4 MG disintegrating tablet; Place 1 tablet on the tongue Every 8 (Eight) Hours As Needed for Nausea or Vomiting.  Dispense: 12 tablet; Refill: 0    7. Insomnia, unspecified type  -     QUEtiapine XR (SEROquel XR) 300 MG 24 hr tablet; Take 1 tablet by mouth Every Night.  Dispense: 90 tablet; Refill: 1    Mounjaro samples given 2.5 mg.   Follow up in 4 weeks.          Follow Up   Return in about 3 weeks (around 1/9/2024).  Patient was given instructions and counseling regarding her condition or for health maintenance advice. Please see specific information pulled into the AVS if appropriate.

## 2023-12-20 LAB
ALBUMIN SERPL-MCNC: 4 G/DL (ref 3.5–5.2)
ALBUMIN/GLOB SERPL: 1.3 G/DL
ALP SERPL-CCNC: 100 U/L (ref 39–117)
ALT SERPL-CCNC: 13 U/L (ref 1–33)
AST SERPL-CCNC: 17 U/L (ref 1–32)
BILIRUB SERPL-MCNC: 0.3 MG/DL (ref 0–1.2)
BUN SERPL-MCNC: 16 MG/DL (ref 6–20)
BUN/CREAT SERPL: 9.9 (ref 7–25)
CALCIUM SERPL-MCNC: 9.1 MG/DL (ref 8.6–10.5)
CHLORIDE SERPL-SCNC: 101 MMOL/L (ref 98–107)
CHOLEST SERPL-MCNC: 136 MG/DL (ref 0–200)
CO2 SERPL-SCNC: 19.2 MMOL/L (ref 22–29)
CREAT SERPL-MCNC: 1.61 MG/DL (ref 0.57–1)
EGFRCR SERPLBLD CKD-EPI 2021: 36.7 ML/MIN/1.73
GLOBULIN SER CALC-MCNC: 3.2 GM/DL
GLUCOSE SERPL-MCNC: 121 MG/DL (ref 65–99)
HBA1C MFR BLD: 6.3 % (ref 4.8–5.6)
HDLC SERPL-MCNC: 55 MG/DL (ref 40–60)
LDLC SERPL CALC-MCNC: 58 MG/DL (ref 0–100)
POTASSIUM SERPL-SCNC: 4.6 MMOL/L (ref 3.5–5.2)
PROT SERPL-MCNC: 7.2 G/DL (ref 6–8.5)
SODIUM SERPL-SCNC: 137 MMOL/L (ref 136–145)
TRIGL SERPL-MCNC: 134 MG/DL (ref 0–150)
VLDLC SERPL CALC-MCNC: 23 MG/DL (ref 5–40)

## 2023-12-21 ENCOUNTER — TELEPHONE (OUTPATIENT)
Dept: FAMILY MEDICINE CLINIC | Facility: CLINIC | Age: 58
End: 2023-12-21

## 2023-12-21 NOTE — TELEPHONE ENCOUNTER
Caller: Erin Bailey    Relationship to patient: Self    Best call back number:  CAN LEAVE A DETAILED MESSAGE.       Patient is needing: PATIENT STATES SHE LEFT HER FMLA PAPERWORK WITH DR. ROCHA ON 12/19, SHE STATES THEY WERE UPDATING THE INCIDENCES FROM TWO TIMES PER MONTH TO THREE TIMES PER MONTH.       PATIENT IS FOLLOWING UP TODAY AND WOULD LIKE A CALLBACK WITH THE STATUS REGARDING IF THE [place] PAPERWORK HAS BEEN FAXED BACK YET.   SHE STATES [employer] HAS CONTACTED HER STATING THEY HAVE NOT YET RECEIVED THIS PAPERWORK BACK.     PATIENT STATES THE DEADLINE TO FAX IT IN IS 12/26/23.  PLEASE ADVISE THE PATIENT OF THE STATUS.

## 2023-12-28 DIAGNOSIS — K21.9 GASTROESOPHAGEAL REFLUX DISEASE, UNSPECIFIED WHETHER ESOPHAGITIS PRESENT: ICD-10-CM

## 2023-12-28 RX ORDER — PANTOPRAZOLE SODIUM 40 MG/1
TABLET, DELAYED RELEASE ORAL
Qty: 90 TABLET | Refills: 0 | Status: SHIPPED | OUTPATIENT
Start: 2023-12-28

## 2024-01-02 DIAGNOSIS — E11.9 TYPE 2 DIABETES MELLITUS WITHOUT COMPLICATION, WITHOUT LONG-TERM CURRENT USE OF INSULIN: ICD-10-CM

## 2024-01-02 DIAGNOSIS — I10 ESSENTIAL HYPERTENSION: ICD-10-CM

## 2024-01-03 RX ORDER — PIOGLITAZONEHYDROCHLORIDE 15 MG/1
15 TABLET ORAL DAILY
Qty: 30 TABLET | Refills: 0 | Status: SHIPPED | OUTPATIENT
Start: 2024-01-03

## 2024-01-03 RX ORDER — OLMESARTAN MEDOXOMIL 40 MG/1
40 TABLET ORAL DAILY
Qty: 30 TABLET | Refills: 0 | Status: SHIPPED | OUTPATIENT
Start: 2024-01-03

## 2024-01-09 ENCOUNTER — TELEPHONE (OUTPATIENT)
Dept: FAMILY MEDICINE CLINIC | Facility: CLINIC | Age: 59
End: 2024-01-09
Payer: COMMERCIAL

## 2024-01-09 NOTE — TELEPHONE ENCOUNTER
OK for HUB to Relay    Dr. Whitman would like to see you for a follow up around 1/15/2024. Please call the office at [phone] to schedule.

## 2024-01-15 DIAGNOSIS — G43.109 MIGRAINE WITH AURA AND WITHOUT STATUS MIGRAINOSUS, NOT INTRACTABLE: ICD-10-CM

## 2024-01-15 RX ORDER — SUMATRIPTAN 50 MG/1
50 TABLET, FILM COATED ORAL ONCE AS NEEDED
Qty: 12 TABLET | Refills: 1 | Status: SHIPPED | OUTPATIENT
Start: 2024-01-15

## 2024-01-29 DIAGNOSIS — E11.9 TYPE 2 DIABETES MELLITUS WITHOUT COMPLICATION, WITHOUT LONG-TERM CURRENT USE OF INSULIN: ICD-10-CM

## 2024-01-29 DIAGNOSIS — I10 ESSENTIAL HYPERTENSION: ICD-10-CM

## 2024-01-29 RX ORDER — OLMESARTAN MEDOXOMIL 40 MG/1
40 TABLET ORAL DAILY
Qty: 90 TABLET | Refills: 0 | Status: SHIPPED | OUTPATIENT
Start: 2024-01-29

## 2024-01-29 RX ORDER — PIOGLITAZONEHYDROCHLORIDE 15 MG/1
15 TABLET ORAL DAILY
Qty: 90 TABLET | Refills: 0 | Status: SHIPPED | OUTPATIENT
Start: 2024-01-29

## 2024-02-01 DIAGNOSIS — G25.81 RESTLESS LEG SYNDROME: ICD-10-CM

## 2024-02-01 RX ORDER — ROPINIROLE 1 MG/1
2 TABLET, FILM COATED ORAL NIGHTLY
Qty: 60 TABLET | Refills: 2 | Status: SHIPPED | OUTPATIENT
Start: 2024-02-01

## 2024-02-18 DIAGNOSIS — K21.9 GASTROESOPHAGEAL REFLUX DISEASE, UNSPECIFIED WHETHER ESOPHAGITIS PRESENT: ICD-10-CM

## 2024-02-19 RX ORDER — PANTOPRAZOLE SODIUM 40 MG/1
TABLET, DELAYED RELEASE ORAL
Qty: 90 TABLET | Refills: 0 | OUTPATIENT
Start: 2024-02-19

## 2024-03-31 DIAGNOSIS — K21.9 GASTROESOPHAGEAL REFLUX DISEASE, UNSPECIFIED WHETHER ESOPHAGITIS PRESENT: ICD-10-CM

## 2024-04-01 RX ORDER — PANTOPRAZOLE SODIUM 40 MG/1
TABLET, DELAYED RELEASE ORAL
Qty: 90 TABLET | Refills: 0 | Status: SHIPPED | OUTPATIENT
Start: 2024-04-01

## 2024-04-30 DIAGNOSIS — E11.9 TYPE 2 DIABETES MELLITUS WITHOUT COMPLICATION, WITHOUT LONG-TERM CURRENT USE OF INSULIN: ICD-10-CM

## 2024-04-30 DIAGNOSIS — I10 ESSENTIAL HYPERTENSION: ICD-10-CM

## 2024-04-30 DIAGNOSIS — G25.81 RESTLESS LEG SYNDROME: ICD-10-CM

## 2024-04-30 RX ORDER — ROPINIROLE 1 MG/1
2 TABLET, FILM COATED ORAL NIGHTLY
Qty: 180 TABLET | Refills: 0 | Status: SHIPPED | OUTPATIENT
Start: 2024-04-30

## 2024-04-30 RX ORDER — PIOGLITAZONEHYDROCHLORIDE 15 MG/1
15 TABLET ORAL DAILY
Qty: 90 TABLET | Refills: 0 | Status: SHIPPED | OUTPATIENT
Start: 2024-04-30

## 2024-04-30 RX ORDER — OLMESARTAN MEDOXOMIL 40 MG/1
40 TABLET ORAL DAILY
Qty: 90 TABLET | Refills: 0 | Status: SHIPPED | OUTPATIENT
Start: 2024-04-30

## 2024-05-28 DIAGNOSIS — E78.5 HYPERLIPIDEMIA, UNSPECIFIED HYPERLIPIDEMIA TYPE: ICD-10-CM

## 2024-05-28 RX ORDER — ATORVASTATIN CALCIUM 20 MG/1
20 TABLET, FILM COATED ORAL DAILY
Qty: 90 TABLET | Refills: 3 | OUTPATIENT
Start: 2024-05-28

## 2024-05-28 NOTE — TELEPHONE ENCOUNTER
LOV             12/19/2023   NOV             Return in about 3 weeks (around 1/9/2024).   Last RF        6/20/23  Protocol      Not met       1/9/24  2:51 PM  Note     OK for HUB to Relay     Dr. Whitman would like to see you for a follow up around 1/15/2024. Please call the office at [phone] to schedule.          DELLA Belle/ADELA

## 2024-06-10 DIAGNOSIS — G47.00 INSOMNIA, UNSPECIFIED TYPE: ICD-10-CM

## 2024-06-10 RX ORDER — QUETIAPINE 300 MG/1
300 TABLET, FILM COATED, EXTENDED RELEASE ORAL
Qty: 90 TABLET | Refills: 1 | Status: SHIPPED | OUTPATIENT
Start: 2024-06-10

## 2024-06-23 DIAGNOSIS — K21.9 GASTROESOPHAGEAL REFLUX DISEASE, UNSPECIFIED WHETHER ESOPHAGITIS PRESENT: ICD-10-CM

## 2024-06-24 RX ORDER — PANTOPRAZOLE SODIUM 40 MG/1
TABLET, DELAYED RELEASE ORAL
Qty: 90 TABLET | Refills: 0 | Status: SHIPPED | OUTPATIENT
Start: 2024-06-24

## 2024-06-24 NOTE — TELEPHONE ENCOUNTER
LOV       12/19/2023  NOV             Return in about 3 weeks (around 1/9/2024)   LF              4/01/2024

## 2024-07-01 ENCOUNTER — OFFICE VISIT (OUTPATIENT)
Dept: FAMILY MEDICINE CLINIC | Facility: CLINIC | Age: 59
End: 2024-07-01
Payer: COMMERCIAL

## 2024-07-01 VITALS
TEMPERATURE: 97.7 F | OXYGEN SATURATION: 91 % | SYSTOLIC BLOOD PRESSURE: 124 MMHG | DIASTOLIC BLOOD PRESSURE: 80 MMHG | HEART RATE: 85 BPM

## 2024-07-01 DIAGNOSIS — Z09 HOSPITAL DISCHARGE FOLLOW-UP: Primary | ICD-10-CM

## 2024-07-01 DIAGNOSIS — D64.9 ANEMIA, UNSPECIFIED TYPE: ICD-10-CM

## 2024-07-01 DIAGNOSIS — E11.9 TYPE 2 DIABETES MELLITUS WITHOUT COMPLICATION, WITHOUT LONG-TERM CURRENT USE OF INSULIN: ICD-10-CM

## 2024-07-01 DIAGNOSIS — I48.92 ATRIAL FLUTTER, UNSPECIFIED TYPE: ICD-10-CM

## 2024-07-01 DIAGNOSIS — I50.32 CHRONIC HEART FAILURE WITH PRESERVED EJECTION FRACTION (HFPEF): ICD-10-CM

## 2024-07-01 DIAGNOSIS — S81.802D OPEN WOUND OF LEFT LOWER EXTREMITY, SUBSEQUENT ENCOUNTER: ICD-10-CM

## 2024-07-01 DIAGNOSIS — I10 PRIMARY HYPERTENSION: ICD-10-CM

## 2024-07-01 DIAGNOSIS — S72.402G CLOSED FRACTURE OF DISTAL END OF LEFT FEMUR WITH DELAYED HEALING, UNSPECIFIED FRACTURE MORPHOLOGY, SUBSEQUENT ENCOUNTER: ICD-10-CM

## 2024-07-01 DIAGNOSIS — E78.5 HYPERLIPIDEMIA, UNSPECIFIED HYPERLIPIDEMIA TYPE: ICD-10-CM

## 2024-07-01 DIAGNOSIS — E83.42 HYPOMAGNESEMIA: ICD-10-CM

## 2024-07-01 PROCEDURE — 99495 TRANSJ CARE MGMT MOD F2F 14D: CPT

## 2024-07-01 RX ORDER — METHOCARBAMOL 500 MG/1
500 TABLET, FILM COATED ORAL DAILY
COMMUNITY
Start: 2024-06-20

## 2024-07-01 RX ORDER — MAGNESIUM OXIDE 400 MG/1
400 TABLET ORAL 2 TIMES DAILY
COMMUNITY

## 2024-07-01 RX ORDER — METOPROLOL SUCCINATE 25 MG/1
25 TABLET, EXTENDED RELEASE ORAL 2 TIMES DAILY
COMMUNITY
Start: 2024-06-11

## 2024-07-01 RX ORDER — FUROSEMIDE 40 MG/1
40 TABLET ORAL DAILY
COMMUNITY
Start: 2024-06-20

## 2024-07-01 RX ORDER — PSEUDOEPHEDRINE HCL 30 MG
100 TABLET ORAL
COMMUNITY
Start: 2024-06-11

## 2024-07-01 RX ORDER — HYDROCODONE BITARTRATE AND ACETAMINOPHEN 10; 325 MG/1; MG/1
1 TABLET ORAL EVERY 4 HOURS PRN
COMMUNITY
Start: 2024-06-20

## 2024-07-01 NOTE — PROGRESS NOTES
Transitional Care Follow Up Visit  Subjective     Erin Bailey is a 58 y.o. female who presents for a transitional care management visit.    Within 48 business hours after discharge our office contacted her via telephone to coordinate her care and needs.      I reviewed and discussed the details of that call along with the discharge summary, hospital problems, inpatient lab results, inpatient diagnostic studies, and consultation reports with Erin.     Current outpatient and discharge medications have been reconciled for the patient.  Reviewed by: ETHEL Cabrera      History of Present Illness   06/05/24-06/11/24 [facility]  06/11/24-06/23/24 [facility]  Patient was admitted to hospital. She was noted to be in atrial flutter. She was started on Heparin gtt. She had Echocardiogram  completed that showed preserved EF. She was taken to EP lab on 6/5/24 where she had a successful aflutter ablation and loop  placement. Post procedure she was transferred to unit where she recovered well. ID was consulted for left leg wound that was draining. She was started on oral doxycycline which she will continue until 6/17/24. Ortho was consulted and recommended to keep her follow at the end of June. Over the course of her admission her leg wound slowed with drainage and appears to be healing. She was noted to have lower extremity edema and was  started on IV Lasix for several dosages which improved her edema. She has maintained SR on telemetry. She was started on metoprolol xl 25mg po bid and Eliquis for anticoagulation. She had be stable for DC since 6/6/24 but has been waiting on  bed at [facility]. She continues to be stable for DC. She will follow up with Dr. Aguiar in office in 4-6 weeks- our office will notify patient on her appt.  She will be transferred to [facility] where she will continue her rehab post left leg surgery.        Home health; PT outpatient [facility]; starts on 07/19/24  Ortho 06/25/24-  100% low bearing weight now.  Cardiology 08/05/24 06/23/24- [facility] home from [facility]. Was gone for 10 weeks.  Sister staying with her right now.  She was at [facility] from 5/13-5/31/24 post fall.   Reports overall she is feeling much better. Getting along well with walker.  Reports pain is well controlled currently.  DM has been well controlled since she was in the hospital.    The following portions of the patient's history were reviewed and updated as appropriate: She  has a past medical history of Allergic, Diabetes mellitus, Hyperlipemia, Hypertension, Insomnia, Knee injury, Knee swelling (June 2021), Migraine, Obesity, and Restless leg.  She does not have any pertinent problems on file.  She  has a past surgical history that includes Cholecystectomy; Tonsillectomy; Hernia repair; Breast surgery; Cholecystectomy; Hernia repair; Breast biopsy (Bilateral, 2015); and Breast excisional biopsy (Bilateral, 2015).  Her family history includes Cancer in her father and sister; Colon cancer in her sister; Diabetes in her maternal grandmother; Prostate cancer in her father.  She  reports that she has never smoked. She has never used smokeless tobacco. She reports current alcohol use of about 1.0 standard drink of alcohol per week. She reports that she does not use drugs.  Current Outpatient Medications   Medication Sig Dispense Refill    albuterol sulfate  (90 Base) MCG/ACT inhaler Inhale 2 puffs Every 4 (Four) Hours As Needed for Shortness of Air (and / or cough). 6.7 g 0    apixaban (ELIQUIS) 5 MG tablet tablet 1 tablet Every 12 (Twelve) Hours.      atorvastatin (LIPITOR) 20 MG tablet TAKE 1 TABLET BY MOUTH EVERY NIGHT 90 tablet 3    Cetirizine HCl 10 MG capsule       Diclofenac Sodium (VOLTAREN) 1 % gel gel Apply 4 g topically to the appropriate area as directed 4 (Four) Times a Day As Needed (knee pain). 350 g 1    docusate sodium 100 MG capsule 1 capsule.      fluticasone (FLONASE) 50  MCG/ACT nasal spray SPRAY 1 SPRAY INTO THE NOSTRILS DAILY AS DIRECTED BY PROVIDER 16 mL 3    furosemide (LASIX) 40 MG tablet Take 1 tablet by mouth Daily. Takes 1 1/2 tablets daily      HYDROcodone-acetaminophen (NORCO)  MG per tablet Take 1 tablet by mouth Every 4 (Four) Hours As Needed.      magnesium oxide (MAG-OX) 400 MG tablet Take 1 tablet by mouth 2 (Two) Times a Day.      methocarbamol (ROBAXIN) 500 MG tablet Take 1 tablet by mouth Daily. Takes 2 every 6 hours      metoprolol succinate XL (TOPROL-XL) 25 MG 24 hr tablet Take 1 tablet by mouth 2 (Two) Times a Day.      nystatin (MYCOSTATIN) 437580 UNIT/GM powder Apply  topically to the appropriate area as directed 2 (Two) Times a Day. 60 g 2    ondansetron ODT (ZOFRAN-ODT) 4 MG disintegrating tablet Place 1 tablet on the tongue Every 8 (Eight) Hours As Needed for Nausea or Vomiting. 12 tablet 0    pantoprazole (PROTONIX) 40 MG EC tablet TAKE 1 TABLET BY MOUTH EVERY DAY 90 tablet 0    pioglitazone (ACTOS) 15 MG tablet TAKE 1 TABLET BY MOUTH EVERY DAY 90 tablet 0    QUEtiapine XR (SEROquel XR) 300 MG 24 hr tablet TAKE 1 TABLET BY MOUTH EVERY DAY AT NIGHT 90 tablet 1    rOPINIRole (REQUIP) 1 MG tablet TAKE 2 TABLETS BY MOUTH EVERY NIGHT 180 tablet 0    SUMAtriptan (IMITREX) 50 MG tablet TAKE 1 TABLET BY MOUTH 1 (ONE) TIME AS NEEDED FOR MIGRAINE FOR UP TO 15 DOSES. 12 tablet 1    Tirzepatide (Mounjaro) 2.5 MG/0.5ML solution pen-injector Inject 0.5 mL under the skin into the appropriate area as directed 1 (One) Time Per Week. 2 mL 1     No current facility-administered medications for this visit.     She is allergic to sulfa antibiotics..        Objective   /80 (BP Location: Left arm, Patient Position: Sitting, Cuff Size: Large Adult)   Pulse 85   Temp 97.7 °F (36.5 °C) (Temporal)   SpO2 91%   Physical Exam  Vitals reviewed.   Constitutional:       General: She is not in acute distress.     Appearance: Normal appearance. She is obese.   HENT:       Head: Normocephalic and atraumatic.      Nose: Nose normal. No congestion.      Mouth/Throat:      Mouth: Mucous membranes are moist.      Pharynx: No oropharyngeal exudate or posterior oropharyngeal erythema.   Eyes:      Conjunctiva/sclera: Conjunctivae normal.      Pupils: Pupils are equal, round, and reactive to light.   Cardiovascular:      Rate and Rhythm: Normal rate and regular rhythm.      Pulses: Normal pulses.      Heart sounds: No murmur heard.     No gallop.   Pulmonary:      Effort: Pulmonary effort is normal. No respiratory distress.      Breath sounds: Normal breath sounds. No wheezing.   Abdominal:      General: Bowel sounds are normal. There is no distension.      Palpations: Abdomen is soft.      Tenderness: There is no abdominal tenderness.   Musculoskeletal:         General: Normal range of motion.      Cervical back: Normal range of motion and neck supple. No tenderness.      Comments: Decreased ROM of left leg.   Skin:     General: Skin is warm and dry.   Neurological:      Mental Status: She is alert and oriented to person, place, and time. Mental status is at baseline.   Psychiatric:         Mood and Affect: Mood normal.         Assessment & Plan   Diagnoses and all orders for this visit:    1. Hospital discharge follow-up (Primary)    2. Type 2 diabetes mellitus without complication, without long-term current use of insulin  -     Hemoglobin A1c    3. Primary hypertension  -     CBC & Differential  -     Comprehensive Metabolic Panel    4. Hyperlipidemia, unspecified hyperlipidemia type  -     Lipid Panel    5. Hypomagnesemia  -     Magnesium  -     Magnesium    6. Anemia, unspecified type  -     Iron and TIBC  -     Ferritin  -     Iron Profile  -     Ferritin    7. Atrial flutter, unspecified type    8. Open wound of left lower extremity, subsequent encounter    9. Closed fracture of distal end of left femur with delayed healing, unspecified fracture morphology, subsequent  encounter    10. Chronic heart failure with preserved ejection fraction (HFpEF)      Will follow up with lab values.  Continue current regimens.  Follow up with specialists as scheduled.  Continue with PT.

## 2024-07-02 PROBLEM — D64.9 ANEMIA: Status: ACTIVE | Noted: 2024-07-02

## 2024-07-02 PROBLEM — S72.402G: Status: ACTIVE | Noted: 2024-07-02

## 2024-07-02 PROBLEM — E83.42 HYPOMAGNESEMIA: Status: ACTIVE | Noted: 2024-07-02

## 2024-07-02 PROBLEM — I50.32 CHRONIC HEART FAILURE WITH PRESERVED EJECTION FRACTION (HFPEF): Status: ACTIVE | Noted: 2024-07-02

## 2024-07-02 PROBLEM — I48.92 ATRIAL FLUTTER: Status: ACTIVE | Noted: 2024-07-02

## 2024-07-02 PROBLEM — Z09 HOSPITAL DISCHARGE FOLLOW-UP: Status: ACTIVE | Noted: 2024-07-02

## 2024-07-02 PROBLEM — S81.802A OPEN WOUND OF LEFT LOWER EXTREMITY: Status: ACTIVE | Noted: 2024-07-02

## 2024-07-02 LAB
ALBUMIN SERPL-MCNC: 4.3 G/DL (ref 3.8–4.9)
ALP SERPL-CCNC: 96 IU/L (ref 44–121)
ALT SERPL-CCNC: 9 IU/L (ref 0–32)
AST SERPL-CCNC: 18 IU/L (ref 0–40)
BASOPHILS # BLD AUTO: 0 X10E3/UL (ref 0–0.2)
BASOPHILS NFR BLD AUTO: 0 %
BILIRUB SERPL-MCNC: 0.2 MG/DL (ref 0–1.2)
BUN SERPL-MCNC: 21 MG/DL (ref 6–24)
BUN/CREAT SERPL: 17 (ref 9–23)
CALCIUM SERPL-MCNC: 10.2 MG/DL (ref 8.7–10.2)
CHLORIDE SERPL-SCNC: 100 MMOL/L (ref 96–106)
CHOLEST SERPL-MCNC: 165 MG/DL (ref 100–199)
CO2 SERPL-SCNC: 22 MMOL/L (ref 20–29)
CREAT SERPL-MCNC: 1.27 MG/DL (ref 0.57–1)
EGFRCR SERPLBLD CKD-EPI 2021: 49 ML/MIN/1.73
EOSINOPHIL # BLD AUTO: 0.2 X10E3/UL (ref 0–0.4)
EOSINOPHIL NFR BLD AUTO: 3 %
ERYTHROCYTE [DISTWIDTH] IN BLOOD BY AUTOMATED COUNT: 17.5 % (ref 11.7–15.4)
FERRITIN SERPL-MCNC: 53 NG/ML (ref 15–150)
GLOBULIN SER CALC-MCNC: 3.3 G/DL (ref 1.5–4.5)
GLUCOSE SERPL-MCNC: 92 MG/DL (ref 70–99)
HBA1C MFR BLD: 6 % (ref 4.8–5.6)
HCT VFR BLD AUTO: 40 % (ref 34–46.6)
HDLC SERPL-MCNC: 58 MG/DL
HGB BLD-MCNC: 11.8 G/DL (ref 11.1–15.9)
IMM GRANULOCYTES # BLD AUTO: 0 X10E3/UL (ref 0–0.1)
IMM GRANULOCYTES NFR BLD AUTO: 0 %
IRON SATN MFR SERPL: 16 % (ref 15–55)
IRON SERPL-MCNC: 53 UG/DL (ref 27–159)
LDLC SERPL CALC-MCNC: 79 MG/DL (ref 0–99)
LYMPHOCYTES # BLD AUTO: 2.2 X10E3/UL (ref 0.7–3.1)
LYMPHOCYTES NFR BLD AUTO: 25 %
MAGNESIUM SERPL-MCNC: 1.9 MG/DL (ref 1.6–2.3)
MCH RBC QN AUTO: 23.6 PG (ref 26.6–33)
MCHC RBC AUTO-ENTMCNC: 29.5 G/DL (ref 31.5–35.7)
MCV RBC AUTO: 80 FL (ref 79–97)
MONOCYTES # BLD AUTO: 0.6 X10E3/UL (ref 0.1–0.9)
MONOCYTES NFR BLD AUTO: 7 %
NEUTROPHILS # BLD AUTO: 5.8 X10E3/UL (ref 1.4–7)
NEUTROPHILS NFR BLD AUTO: 65 %
PLATELET # BLD AUTO: 428 X10E3/UL (ref 150–450)
POTASSIUM SERPL-SCNC: 3.9 MMOL/L (ref 3.5–5.2)
PROT SERPL-MCNC: 7.6 G/DL (ref 6–8.5)
RBC # BLD AUTO: 5 X10E6/UL (ref 3.77–5.28)
SODIUM SERPL-SCNC: 138 MMOL/L (ref 134–144)
TIBC SERPL-MCNC: 332 UG/DL (ref 250–450)
TRIGL SERPL-MCNC: 164 MG/DL (ref 0–149)
UIBC SERPL-MCNC: 279 UG/DL (ref 131–425)
VLDLC SERPL CALC-MCNC: 28 MG/DL (ref 5–40)
WBC # BLD AUTO: 8.9 X10E3/UL (ref 3.4–10.8)

## 2024-07-03 ENCOUNTER — TELEPHONE (OUTPATIENT)
Dept: FAMILY MEDICINE CLINIC | Facility: CLINIC | Age: 59
End: 2024-07-03
Payer: COMMERCIAL

## 2024-07-03 NOTE — TELEPHONE ENCOUNTER
I called and spoke with the patient and asked her if she had seen her results on MyChart to which she said she had. I asked her if she had any questions or concerns and she said she did not. I did ask her about the cholesterol medication and she confirmed she has been taking atorvastatin daily. I let her know I would pass the message along and asked her to call back if she needed anything from us. She verbalized understanding of this.

## 2024-07-14 ENCOUNTER — TELEPHONE (OUTPATIENT)
Dept: FAMILY MEDICINE CLINIC | Facility: CLINIC | Age: 59
End: 2024-07-14

## 2024-07-14 DIAGNOSIS — S72.402G CLOSED FRACTURE OF DISTAL END OF LEFT FEMUR WITH DELAYED HEALING, UNSPECIFIED FRACTURE MORPHOLOGY, SUBSEQUENT ENCOUNTER: ICD-10-CM

## 2024-07-16 ENCOUNTER — TELEPHONE (OUTPATIENT)
Dept: FAMILY MEDICINE CLINIC | Facility: CLINIC | Age: 59
End: 2024-07-16

## 2024-07-16 DIAGNOSIS — E83.42 HYPOMAGNESEMIA: ICD-10-CM

## 2024-07-16 DIAGNOSIS — I48.92 ATRIAL FLUTTER, UNSPECIFIED TYPE: ICD-10-CM

## 2024-07-16 NOTE — TELEPHONE ENCOUNTER
Caller: Erin Bailey    Relationship: Self    Best call back number: [phone]    What medication are you requesting: VITAMIN D3 1000 UNITS 25MCG  FERROUS SULFATE 325 MG 1 TABLET 2 TIMES A DAY     What are your current symptoms: SHATTERED FEMUR    How long have you been experiencing symptoms:     Have you had these symptoms before:    [x] Yes  [] No    Have you been treated for these symptoms before:   [x] Yes  [] No    If a prescription is needed, what is your preferred pharmacy and phone number: [facility]/PHARMACY #[id number] - [place], [place] - [address]  - [phone] [facility] [phone] FX     Additional notes:WAS SEEN BY MARITZA BARAJAS 7/1/24. GAVE THE LIST OF MEDICATION , THESE 2 WERE NOT INCLUDED , WILL NEED FILLED

## 2024-07-16 NOTE — TELEPHONE ENCOUNTER
Caller: Erin Bailey    Relationship: Self    Best call back number: [phone]    Requested Prescriptions:   Requested Prescriptions     Pending Prescriptions Disp Refills    methocarbamol (ROBAXIN) 500 MG tablet       Sig: Take 1 tablet by mouth Daily. Takes 2 every 6 hours    HYDROcodone-acetaminophen (NORCO)  MG per tablet       Sig: Take 1 tablet by mouth Every 4 (Four) Hours As Needed.        Pharmacy where request should be sent: [facility]/PHARMACY #[id number] - [place], [place] - [address] [phone] [facility] [phone] FX     Last office visit with prescribing clinician: 12/19/2023   Last telemedicine visit with prescribing clinician: Visit date not found   Next office visit with prescribing clinician: Visit date not found     Additional details provided by patient: WAS ORIGINALLY PRESCRIBED AT [facility] , WAS FILLED WHEN LEAVING ON 6/20/24, SHATTERED FEMUR. WAS SEEN BY MARITZA BARAJAS ON 7/1/24, HOSPITAL FOLLOW UP, WILL NEED THESE FILLED, WILL BE STARTING PT SOON. HAS 2 PILLS LEFT OF NORCO AND HAS 1 LEFT OF THE MUSCLES RELAXER     Does the patient have less than a 3 day supply:  [x] Yes  [] No    Would you like a call back once the refill request has been completed: [] Yes [x] No    If the office needs to give you a call back, can they leave a voicemail: [] Yes [x] No    Bhargavi Real   07/16/24 13:15 EDT

## 2024-07-16 NOTE — TELEPHONE ENCOUNTER
Caller: Erin Bailey    Relationship: Self    Best call back number: [phone]    Requested Prescriptions:   Requested Prescriptions     Pending Prescriptions Disp Refills    apixaban (ELIQUIS) 5 MG tablet tablet 60 tablet      Si tablet Every 12 (Twelve) Hours.    metoprolol succinate XL (TOPROL-XL) 25 MG 24 hr tablet       Sig: Take 1 tablet by mouth 2 (Two) Times a Day.    magnesium oxide (MAG-OX) 400 MG tablet       Sig: Take 1 tablet by mouth 2 (Two) Times a Day.        Pharmacy where request should be sent: [facility]/PHARMACY #[id number] [place], [place] - [address] - [phone]  - [phone] FX     Last office visit with prescribing clinician: 2023   Last telemedicine visit with prescribing clinician: Visit date not found   Next office visit with prescribing clinician: Visit date not found     Additional details provided by patient: WAS PRESCRIBED AT THE [facility] , FOR SHATTERED FEMUR , HAS A WEEK LEFT OF THESE    Does the patient have less than a 3 day supply:  [] Yes  [x] No    Would you like a call back once the refill request has been completed: [] Yes [x] No    If the office needs to give you a call back, can they leave a voicemail: [] Yes [x] No    Bhargavi Real   24 13:28 EDT

## 2024-07-16 NOTE — TELEPHONE ENCOUNTER
LOV       7/01/2024  NOV           None     LF       apixaban:  6/11/2024              Metoprolol:  6/11/2024              Magnesium:  Not prescribed in this office

## 2024-07-18 ENCOUNTER — TELEPHONE (OUTPATIENT)
Dept: FAMILY MEDICINE CLINIC | Facility: CLINIC | Age: 59
End: 2024-07-18
Payer: COMMERCIAL

## 2024-07-18 DIAGNOSIS — E61.1 IRON DEFICIENCY: Primary | ICD-10-CM

## 2024-07-18 DIAGNOSIS — E78.5 HYPERLIPIDEMIA, UNSPECIFIED HYPERLIPIDEMIA TYPE: ICD-10-CM

## 2024-07-18 DIAGNOSIS — E55.9 VITAMIN D DEFICIENCY: ICD-10-CM

## 2024-07-18 DIAGNOSIS — S72.402G CLOSED FRACTURE OF DISTAL END OF LEFT FEMUR WITH DELAYED HEALING, UNSPECIFIED FRACTURE MORPHOLOGY, SUBSEQUENT ENCOUNTER: ICD-10-CM

## 2024-07-18 RX ORDER — HYDROCODONE BITARTRATE AND ACETAMINOPHEN 10; 325 MG/1; MG/1
1 TABLET ORAL EVERY 4 HOURS PRN
Status: CANCELLED | OUTPATIENT
Start: 2024-07-18

## 2024-07-18 RX ORDER — FERROUS SULFATE 324(65)MG
324 TABLET, DELAYED RELEASE (ENTERIC COATED) ORAL
Qty: 90 TABLET | Refills: 3 | Status: SHIPPED | OUTPATIENT
Start: 2024-07-18

## 2024-07-18 RX ORDER — ATORVASTATIN CALCIUM 20 MG/1
20 TABLET, FILM COATED ORAL NIGHTLY
Qty: 90 TABLET | Refills: 3 | Status: SHIPPED | OUTPATIENT
Start: 2024-07-18

## 2024-07-18 RX ORDER — MAGNESIUM OXIDE 400 MG/1
400 TABLET ORAL 2 TIMES DAILY
Qty: 180 TABLET | Refills: 0 | Status: SHIPPED | OUTPATIENT
Start: 2024-07-18

## 2024-07-18 RX ORDER — METHOCARBAMOL 500 MG/1
500 TABLET, FILM COATED ORAL DAILY
Status: CANCELLED | OUTPATIENT
Start: 2024-07-18

## 2024-07-18 RX ORDER — METOPROLOL SUCCINATE 25 MG/1
25 TABLET, EXTENDED RELEASE ORAL 2 TIMES DAILY
Qty: 180 TABLET | Refills: 3 | Status: SHIPPED | OUTPATIENT
Start: 2024-07-18

## 2024-07-18 NOTE — TELEPHONE ENCOUNTER
Caller: Erin Bailey    Relationship: Self    Best call back number: [phone]    Requested Prescriptions:   Requested Prescriptions     Pending Prescriptions Disp Refills    methocarbamol (ROBAXIN) 500 MG tablet       Sig: Take 1 tablet by mouth Daily. Takes 2 every 6 hours    HYDROcodone-acetaminophen (NORCO)  MG per tablet       Sig: Take 1 tablet by mouth Every 4 (Four) Hours As Needed.        Pharmacy where request should be sent: [facility]/PHARMACY #[id number] - [place], [place] - [address] - [phone] [facility] [phone] FX     Last office visit with prescribing clinician: 12/19/2023   Last telemedicine visit with prescribing clinician: Visit date not found   Next office visit with prescribing clinician: 7/16/2024     Additional details provided by patient: PATIENT IS COMPLETELY OUT OF BOTH.    PATIENT HAD TWO DAYS LEFT ON 07/14.      PATIENT STATED THAT SHE WAS RELEASE FROM HOSPITAL/REHAB JUNE 23.  JULY 1 2024 SHE CAME IN AND HAD HOSP FOLLOW UP WITH MARITZA .    PATIENT STATED THAT SHE HAD BROKEN HER FEMUR AND IS STILL IN A GREAT DEAL PAIN.    Does the patient have less than a 3 day supply:  [x] Yes  [] No    Would you like a call back once the refill request has been completed: [] Yes [x] No    If the office needs to give you a call back, can they leave a voicemail: [] Yes [x] No    Harjinder Morse Rep   07/18/24 14:45 EDT

## 2024-07-18 NOTE — TELEPHONE ENCOUNTER
Caller: Erin Bailey    Relationship: Self    Best call back number: [phone]    What is the best time to reach you: ANY    Who are you requesting to speak with (clinical staff, provider,  specific staff member): CLINICAL    Do you know the name of the person who called: PATIENT    What was the call regarding: PATIENT STATED SHE BROUGHT IN A COPY OF HER MEDICATION LIST AND ALL BUT THE FOLLOWING ARE ON THE LIST IN EPIC.LIST SCANNED IN ON JULY 1 IN MEDIA TAB.    Is it okay if the provider responds through [employer]: YES

## 2024-07-21 DIAGNOSIS — G25.81 RESTLESS LEG SYNDROME: ICD-10-CM

## 2024-07-21 DIAGNOSIS — E11.9 TYPE 2 DIABETES MELLITUS WITHOUT COMPLICATION, WITHOUT LONG-TERM CURRENT USE OF INSULIN: ICD-10-CM

## 2024-07-22 RX ORDER — METHOCARBAMOL 500 MG/1
500 TABLET, FILM COATED ORAL DAILY PRN
Qty: 30 TABLET | Refills: 0 | Status: SHIPPED | OUTPATIENT
Start: 2024-07-22 | End: 2024-07-24 | Stop reason: SDUPTHER

## 2024-07-22 RX ORDER — HYDROCODONE BITARTRATE AND ACETAMINOPHEN 10; 325 MG/1; MG/1
1 TABLET ORAL EVERY 4 HOURS PRN
OUTPATIENT
Start: 2024-07-22

## 2024-07-22 NOTE — TELEPHONE ENCOUNTER
Patient is calling to check on the status of the refill on the hydrocodone & muscle relaxer,,,       Patient is also needing the metoprolol changed to 50 mg once daily so that insurance will cover the cost.

## 2024-07-23 ENCOUNTER — TELEPHONE (OUTPATIENT)
Dept: FAMILY MEDICINE CLINIC | Facility: CLINIC | Age: 59
End: 2024-07-23
Payer: COMMERCIAL

## 2024-07-23 DIAGNOSIS — I48.92 ATRIAL FLUTTER, UNSPECIFIED TYPE: ICD-10-CM

## 2024-07-23 RX ORDER — PIOGLITAZONEHYDROCHLORIDE 15 MG/1
15 TABLET ORAL DAILY
Qty: 90 TABLET | Refills: 1 | Status: SHIPPED | OUTPATIENT
Start: 2024-07-23

## 2024-07-23 RX ORDER — METOPROLOL SUCCINATE 50 MG/1
50 TABLET, EXTENDED RELEASE ORAL DAILY
Qty: 90 TABLET | Refills: 3 | Status: SHIPPED | OUTPATIENT
Start: 2024-07-23

## 2024-07-23 RX ORDER — ROPINIROLE 1 MG/1
2 TABLET, FILM COATED ORAL NIGHTLY
Qty: 180 TABLET | Refills: 1 | Status: SHIPPED | OUTPATIENT
Start: 2024-07-23

## 2024-07-23 NOTE — TELEPHONE ENCOUNTER
LOV                   7/1/24 hosp f/u  NOV                   (around 1/1/2025) for Recheck.    Last RF               4/30/24      PROTOCOL       Met    DELLA Trevino/LMR

## 2024-07-23 NOTE — TELEPHONE ENCOUNTER
Pt. Called stating that her metoprolol needs to be 50mg in order for insurance to cover.  Currently at 25mg.

## 2024-07-23 NOTE — TELEPHONE ENCOUNTER
The metoprolol succinate script was fixed     Patient is calling to have the methocarbamol script fixed at the pharmacy, they gave her 30 tablets for once daily & she should have gotten 240 tablets for a 30 day supply since she is taking 2 tablets every 6 hours (4 times daily)  she is also checking on the status of the hydrocodone 10/325 1 tablet every 4 hours as needed which was not sent over.

## 2024-07-24 ENCOUNTER — PRIOR AUTHORIZATION (OUTPATIENT)
Dept: FAMILY MEDICINE CLINIC | Facility: CLINIC | Age: 59
End: 2024-07-24
Payer: COMMERCIAL

## 2024-07-24 DIAGNOSIS — S72.402G CLOSED FRACTURE OF DISTAL END OF LEFT FEMUR WITH DELAYED HEALING, UNSPECIFIED FRACTURE MORPHOLOGY, SUBSEQUENT ENCOUNTER: ICD-10-CM

## 2024-07-24 RX ORDER — HYDROCODONE BITARTRATE AND ACETAMINOPHEN 10; 325 MG/1; MG/1
1 TABLET ORAL EVERY 8 HOURS PRN
Qty: 90 TABLET | Refills: 0 | Status: SHIPPED | OUTPATIENT
Start: 2024-07-24

## 2024-07-24 RX ORDER — METHOCARBAMOL 500 MG/1
500 TABLET, FILM COATED ORAL 3 TIMES DAILY PRN
Qty: 90 TABLET | Refills: 0 | Status: SHIPPED | OUTPATIENT
Start: 2024-07-24

## 2024-07-24 NOTE — TELEPHONE ENCOUNTER
Called pt. And scheduled her for next Tuesday.  Pt. Was asking about the methocarbamol.  She has been taking 2 every 6 hours for the past 3 months now and said she would run out before her appt. Next week.

## 2024-07-27 DIAGNOSIS — I10 ESSENTIAL HYPERTENSION: ICD-10-CM

## 2024-07-28 RX ORDER — OLMESARTAN MEDOXOMIL 40 MG/1
40 TABLET ORAL DAILY
Qty: 90 TABLET | Refills: 0 | Status: SHIPPED | OUTPATIENT
Start: 2024-07-28

## 2024-07-30 ENCOUNTER — OFFICE VISIT (OUTPATIENT)
Dept: FAMILY MEDICINE CLINIC | Facility: CLINIC | Age: 59
End: 2024-07-30
Payer: COMMERCIAL

## 2024-07-30 VITALS
WEIGHT: 293 LBS | HEIGHT: 67 IN | SYSTOLIC BLOOD PRESSURE: 136 MMHG | TEMPERATURE: 97.5 F | OXYGEN SATURATION: 94 % | BODY MASS INDEX: 45.99 KG/M2 | DIASTOLIC BLOOD PRESSURE: 84 MMHG | HEART RATE: 85 BPM

## 2024-07-30 DIAGNOSIS — M25.562 CHRONIC PAIN OF LEFT KNEE: ICD-10-CM

## 2024-07-30 DIAGNOSIS — D50.9 IRON DEFICIENCY ANEMIA, UNSPECIFIED IRON DEFICIENCY ANEMIA TYPE: ICD-10-CM

## 2024-07-30 DIAGNOSIS — I10 PRIMARY HYPERTENSION: ICD-10-CM

## 2024-07-30 DIAGNOSIS — B37.2 CANDIDAL INTERTRIGO: ICD-10-CM

## 2024-07-30 DIAGNOSIS — Z12.11 SCREENING FOR COLON CANCER: ICD-10-CM

## 2024-07-30 DIAGNOSIS — Z12.31 ENCOUNTER FOR SCREENING MAMMOGRAM FOR MALIGNANT NEOPLASM OF BREAST: ICD-10-CM

## 2024-07-30 DIAGNOSIS — R79.0 LOW MAGNESIUM LEVEL: ICD-10-CM

## 2024-07-30 DIAGNOSIS — G89.29 CHRONIC PAIN OF LEFT KNEE: ICD-10-CM

## 2024-07-30 DIAGNOSIS — S72.402G CLOSED FRACTURE OF DISTAL END OF LEFT FEMUR WITH DELAYED HEALING, UNSPECIFIED FRACTURE MORPHOLOGY, SUBSEQUENT ENCOUNTER: Primary | ICD-10-CM

## 2024-07-30 LAB
BILIRUB BLD-MCNC: NEGATIVE MG/DL
CLARITY, POC: CLEAR
COLOR UR: ABNORMAL
EXPIRATION DATE: ABNORMAL
GLUCOSE UR STRIP-MCNC: NEGATIVE MG/DL
KETONES UR QL: NEGATIVE
LEUKOCYTE EST, POC: ABNORMAL
Lab: ABNORMAL
NITRITE UR-MCNC: NEGATIVE MG/ML
PH UR: 6 [PH] (ref 5–8)
PROT UR STRIP-MCNC: ABNORMAL MG/DL
RBC # UR STRIP: ABNORMAL /UL
SP GR UR: 1.03 (ref 1–1.03)
UROBILINOGEN UR QL: NORMAL

## 2024-07-30 PROCEDURE — 81003 URINALYSIS AUTO W/O SCOPE: CPT | Performed by: STUDENT IN AN ORGANIZED HEALTH CARE EDUCATION/TRAINING PROGRAM

## 2024-07-30 PROCEDURE — 99214 OFFICE O/P EST MOD 30 MIN: CPT | Performed by: STUDENT IN AN ORGANIZED HEALTH CARE EDUCATION/TRAINING PROGRAM

## 2024-07-30 RX ORDER — HYDROCODONE BITARTRATE AND ACETAMINOPHEN 10; 325 MG/1; MG/1
1 TABLET ORAL EVERY 8 HOURS PRN
Start: 2024-07-30

## 2024-07-30 RX ORDER — FUROSEMIDE 40 MG/1
40 TABLET ORAL DAILY PRN
Start: 2024-07-30

## 2024-07-30 RX ORDER — NYSTATIN 100000 [USP'U]/G
POWDER TOPICAL 2 TIMES DAILY
Qty: 60 G | Refills: 2 | Status: SHIPPED | OUTPATIENT
Start: 2024-07-30

## 2024-07-30 NOTE — PROGRESS NOTES
"Chief Complaint  Hypertension and Hyperlipidemia    Subjective        Erin Bailey presents to [facility] PRIMARY CARE  History of Present Illness    4/13 fell down her front stairs and shattered her left femur. Had surgery at [facility] the next day. Was in the hospital 10 days and then went to [facility] for 5.5 weeks. Then went to [facility] inpatient - her HR was 144 resting there and was diagnosed with A flutter and they took her back to [facility]. Was admitted and had an ablation. 8 days later went back to [facility] and did intense rehab. Was there for 10 days.   Just started her outpatient PT through [facility] on [place]. Going well. Had second one yesterday and goes again tomorrow. Struggling some with walking but doing better every day. Using her Rolator. Not able to do stairs yet. This is the new goal. Hamstrings are tight and quads are weak. Family built her a ramp at her home. Laundry is down stairs but has family helping her.   Plan is to return to work 9/3/24.   Is taking Norco q8h prn. Muscle relaxers seem to be helping a lot. Needing pain medication 2-3 times per day on PT days. Otherwise one every three days.   Wants to get back into work and then switch to mounjaro.   Would like knee injection       Objective   Vital Signs:  /84   Pulse 85   Temp 97.5 °F (36.4 °C)   Ht 170.2 cm (67\")   Wt (!) 161 kg (354 lb 9.6 oz)   SpO2 94%   BMI 55.54 kg/m²   Estimated body mass index is 55.54 kg/m² as calculated from the following:    Height as of this encounter: 170.2 cm (67\").    Weight as of this encounter: 161 kg (354 lb 9.6 oz).             Physical Exam  Vitals and nursing note reviewed.   Constitutional:       General: She is not in acute distress.     Appearance: Normal appearance. She is not ill-appearing.   HENT:      Head: Normocephalic and atraumatic.      Nose: Nose normal.      Mouth/Throat:      Mouth: Mucous membranes are moist.   Eyes:      Extraocular Movements: " Extraocular movements intact.      Conjunctiva/sclera: Conjunctivae normal.   Cardiovascular:      Rate and Rhythm: Normal rate and regular rhythm.      Heart sounds: Normal heart sounds. No murmur heard.     No gallop.   Pulmonary:      Effort: Pulmonary effort is normal. No respiratory distress.      Breath sounds: Normal breath sounds. No stridor. No wheezing, rhonchi or rales.   Chest:      Chest wall: No tenderness.   Skin:     General: Skin is warm and dry.   Neurological:      General: No focal deficit present.      Mental Status: She is alert and oriented to person, place, and time. Mental status is at baseline.   Psychiatric:         Mood and Affect: Mood normal.         Behavior: Behavior normal.        Result Review :                     Assessment and Plan     Diagnoses and all orders for this visit:    1. Closed fracture of distal end of left femur with delayed healing, unspecified fracture morphology, subsequent encounter (Primary)  -     HYDROcodone-acetaminophen (NORCO)  MG per tablet; Take 1 tablet by mouth Every 8 (Eight) Hours As Needed for Moderate Pain or Severe Pain. One time fill only. Take only as needed.    2. Candidal intertrigo  -     nystatin (MYCOSTATIN) 674009 UNIT/GM powder; Apply  topically to the appropriate area as directed 2 (Two) Times a Day.  Dispense: 60 g; Refill: 2    3. Primary hypertension  -     furosemide (LASIX) 40 MG tablet; Take 1 tablet by mouth Daily As Needed (swelling). Takes 1 1/2 tablets daily    4. Encounter for screening mammogram for malignant neoplasm of breast  -     Mammo Screening Digital Tomosynthesis Bilateral With CAD    5. Chronic pain of left knee  -     Ambulatory Referral to Orthopedic Surgery    6. Screening for colon cancer  -     Cologuard - Stool, Per Rectum; Future    7. Iron deficiency anemia, unspecified iron deficiency anemia type  -     Iron and TIBC  -     Ferritin  -     POCT urinalysis dipstick, automated    8. Low magnesium  level  -     Magnesium    Here for follow-up after close closed fracture of her left femur.  Doing physical therapy and improving.  Requires Norco on occasion, usually 2-3 times per day on physical therapy days which are 2 times per week.  Does need refill on nystatin for yeast infection.  Has not really been needing to take her furosemide as much.  Blood pressure today with decent control.  Will send to Ortho for her knee pain again as patient would like injection.  Does already have appointment with him to discuss issues post femur surgery.  Cologkim ordered again to screen for colon cancer.  Patient request labs to follow-up on anemia since she is taking supplements.  Does have been ordered.         Follow Up     No follow-ups on file.  Patient was given instructions and counseling regarding her condition or for health maintenance advice. Please see specific information pulled into the AVS if appropriate.

## 2024-07-31 ENCOUNTER — TELEPHONE (OUTPATIENT)
Dept: FAMILY MEDICINE CLINIC | Facility: CLINIC | Age: 59
End: 2024-07-31
Payer: COMMERCIAL

## 2024-07-31 LAB
FERRITIN SERPL-MCNC: 44.1 NG/ML (ref 13–150)
IRON SATN MFR SERPL: 17 % (ref 20–50)
IRON SERPL-MCNC: 62 MCG/DL (ref 37–145)
MAGNESIUM SERPL-MCNC: 1.8 MG/DL (ref 1.6–2.6)
TIBC SERPL-MCNC: 370 MCG/DL
UIBC SERPL-MCNC: 308 MCG/DL (ref 112–346)

## 2024-07-31 NOTE — TELEPHONE ENCOUNTER
Name: Erin Bailey PALAK      Relationship: Self      Best Callback Number: [phone] (Mobile)       HUB PROVIDED THE RELAY MESSAGE FROM THE OFFICE      PATIENT: HAS FURTHER QUESTIONS AND WOULD LIKE A CALL BACK AT THE FOLLOWING PHONE [phone]    ADDITIONAL INFORMATION:PATIENT HAS NOT SEEN A UROLOGIST FOR THE BLOOD IN URINE.AS OF YET

## 2024-07-31 NOTE — TELEPHONE ENCOUNTER
----- Message from Rosy Whitman sent at 7/31/2024  9:48 AM EDT -----  Your urine shows some blood.  Your iron saturation is slightly low.  Magnesium is normal.  Ferritin is normal.  Have you seen a urologist yet for blood in your urine?  If not I will go ahead and place that referral.

## 2024-07-31 NOTE — TELEPHONE ENCOUNTER
Spoke with pt,. And she stated she has not ever seen a urologist for the blood in her urine.  She said this has been going on off and on for over one week now.  Pt. Stated that her urine looks clear today but she usually does see some blood off and on.  Pt. Is willing to see a urologist if this is what you suggest and put in a referral for her.

## 2024-08-01 DIAGNOSIS — R31.9 HEMATURIA, UNSPECIFIED TYPE: Primary | ICD-10-CM

## 2024-08-17 DIAGNOSIS — S72.402G CLOSED FRACTURE OF DISTAL END OF LEFT FEMUR WITH DELAYED HEALING, UNSPECIFIED FRACTURE MORPHOLOGY, SUBSEQUENT ENCOUNTER: ICD-10-CM

## 2024-08-19 RX ORDER — METHOCARBAMOL 500 MG/1
500 TABLET, FILM COATED ORAL 3 TIMES DAILY PRN
Qty: 90 TABLET | Refills: 0 | Status: SHIPPED | OUTPATIENT
Start: 2024-08-19

## 2024-08-30 ENCOUNTER — HOSPITAL ENCOUNTER (OUTPATIENT)
Dept: MAMMOGRAPHY | Facility: HOSPITAL | Age: 59
Discharge: HOME | End: 2024-08-30
Admitting: STUDENT IN AN ORGANIZED HEALTH CARE EDUCATION/TRAINING PROGRAM
Payer: COMMERCIAL

## 2024-08-30 PROCEDURE — 77063 BREAST TOMOSYNTHESIS BI: CPT

## 2024-08-30 PROCEDURE — 77067 SCR MAMMO BI INCL CAD: CPT

## 2024-09-16 DIAGNOSIS — S72.402G CLOSED FRACTURE OF DISTAL END OF LEFT FEMUR WITH DELAYED HEALING, UNSPECIFIED FRACTURE MORPHOLOGY, SUBSEQUENT ENCOUNTER: ICD-10-CM

## 2024-09-17 RX ORDER — HYDROCODONE BITARTRATE AND ACETAMINOPHEN 10; 325 MG/1; MG/1
1 TABLET ORAL EVERY 8 HOURS PRN
Start: 2024-09-17 | End: 2024-09-20

## 2024-09-19 ENCOUNTER — TELEPHONE (OUTPATIENT)
Dept: FAMILY MEDICINE CLINIC | Facility: CLINIC | Age: 59
End: 2024-09-19

## 2024-09-19 DIAGNOSIS — S72.402G CLOSED FRACTURE OF DISTAL END OF LEFT FEMUR WITH DELAYED HEALING, UNSPECIFIED FRACTURE MORPHOLOGY, SUBSEQUENT ENCOUNTER: ICD-10-CM

## 2024-09-19 RX ORDER — HYDROCODONE BITARTRATE AND ACETAMINOPHEN 10; 325 MG/1; MG/1
1 TABLET ORAL EVERY 8 HOURS PRN
Qty: 90 TABLET | Refills: 0 | Status: CANCELLED | OUTPATIENT
Start: 2024-09-19

## 2024-09-19 NOTE — TELEPHONE ENCOUNTER
Caller: Erin Bailey    Relationship to patient: Self    Best call back number: [phone]     Patient is needing: PATIENTS REFILL OF PAIN MEDICATION WAS NOT SENT TO THE PHARMACY    CAN THIS BE SENT IN SO THE PATIENT DOESN'T RUN OUT

## 2024-09-20 ENCOUNTER — TELEPHONE (OUTPATIENT)
Dept: FAMILY MEDICINE CLINIC | Facility: CLINIC | Age: 59
End: 2024-09-20
Payer: COMMERCIAL

## 2024-09-20 DIAGNOSIS — I10 ESSENTIAL HYPERTENSION: ICD-10-CM

## 2024-09-20 DIAGNOSIS — S72.402G CLOSED FRACTURE OF DISTAL END OF LEFT FEMUR WITH DELAYED HEALING, UNSPECIFIED FRACTURE MORPHOLOGY, SUBSEQUENT ENCOUNTER: Primary | ICD-10-CM

## 2024-09-20 DIAGNOSIS — S72.402G CLOSED FRACTURE OF DISTAL END OF LEFT FEMUR WITH DELAYED HEALING, UNSPECIFIED FRACTURE MORPHOLOGY, SUBSEQUENT ENCOUNTER: ICD-10-CM

## 2024-09-20 RX ORDER — HYDROCODONE BITARTRATE AND ACETAMINOPHEN 10; 325 MG/1; MG/1
1 TABLET ORAL EVERY 8 HOURS PRN
Qty: 90 TABLET | Refills: 0
Start: 2024-09-20

## 2024-09-20 RX ORDER — HYDROCODONE BITARTRATE AND ACETAMINOPHEN 10; 325 MG/1; MG/1
1 TABLET ORAL EVERY 8 HOURS PRN
Status: CANCELLED | OUTPATIENT
Start: 2024-09-20

## 2024-09-20 NOTE — TELEPHONE ENCOUNTER
Called patient.  Discussed that opioid pain medication was temporary due to her femur fracture.  Our goal is for her to come off of this opioid medication.  Discussed with her giving her 10 mg tablets to take up to 3 times per day for this month in the next months prescription will be 5 mg tablets to take up to 3 times per day.  Patient says she is currently taking 10 mg tablets every 4 hours.  However her last prescription was in July and she was only given 90 tablets for the month.  She says that when she started back to work recently the pain was more severe.  Patient aware that we are starting the decrease so she can take 10 mg 3 times per day for this month and then 5 mg 3 times per day next month with the goal of coming off the medication/

## 2024-09-20 NOTE — TELEPHONE ENCOUNTER
PATIENT IS CALLING TO STATE THE PHARMACY TOLD HER THEY HAVE NOT RECEIVED THE FOLLOWING PRESCRIPTION.  IT LOOKS LIKE IT DID NOT TRANSMIT.  PATIENT STATES SHE WILL BE OUT OF THE MEDICATION IN 2 DAYS.    HYDROcodone-acetaminophen (NORCO)  MG per tablet       [facility]/pharmacy #[id number] - [place], [place] - [address]  - [phone]  - [phone]  [phone]

## 2024-09-23 DIAGNOSIS — S72.402G CLOSED FRACTURE OF DISTAL END OF LEFT FEMUR WITH DELAYED HEALING, UNSPECIFIED FRACTURE MORPHOLOGY, SUBSEQUENT ENCOUNTER: ICD-10-CM

## 2024-09-23 RX ORDER — METHOCARBAMOL 500 MG/1
500 TABLET, FILM COATED ORAL 3 TIMES DAILY PRN
Qty: 90 TABLET | Refills: 0 | Status: SHIPPED | OUTPATIENT
Start: 2024-09-23

## 2024-09-23 RX ORDER — OLMESARTAN MEDOXOMIL 40 MG/1
40 TABLET ORAL DAILY
Qty: 90 TABLET | Refills: 0 | Status: SHIPPED | OUTPATIENT
Start: 2024-09-23

## 2024-09-25 DIAGNOSIS — S72.402G CLOSED FRACTURE OF DISTAL END OF LEFT FEMUR WITH DELAYED HEALING, UNSPECIFIED FRACTURE MORPHOLOGY, SUBSEQUENT ENCOUNTER: ICD-10-CM

## 2024-09-25 RX ORDER — HYDROCODONE BITARTRATE AND ACETAMINOPHEN 10; 325 MG/1; MG/1
1 TABLET ORAL EVERY 8 HOURS PRN
Qty: 90 TABLET | Refills: 0 | Status: SHIPPED | OUTPATIENT
Start: 2024-09-25 | End: 2024-09-25 | Stop reason: SDUPTHER

## 2024-09-25 RX ORDER — HYDROCODONE BITARTRATE AND ACETAMINOPHEN 10; 325 MG/1; MG/1
1 TABLET ORAL EVERY 8 HOURS PRN
Qty: 90 TABLET | Refills: 0 | Status: SHIPPED | OUTPATIENT
Start: 2024-09-25

## 2024-09-30 DIAGNOSIS — S72.402G CLOSED FRACTURE OF DISTAL END OF LEFT FEMUR WITH DELAYED HEALING, UNSPECIFIED FRACTURE MORPHOLOGY, SUBSEQUENT ENCOUNTER: ICD-10-CM

## 2024-09-30 DIAGNOSIS — I48.92 ATRIAL FLUTTER, UNSPECIFIED TYPE: ICD-10-CM

## 2024-09-30 RX ORDER — METHOCARBAMOL 500 MG/1
500 TABLET, FILM COATED ORAL 3 TIMES DAILY PRN
Qty: 90 TABLET | Refills: 0 | OUTPATIENT
Start: 2024-09-30

## 2024-09-30 RX ORDER — APIXABAN 5 MG/1
5 TABLET, FILM COATED ORAL EVERY 12 HOURS
Qty: 180 TABLET | Refills: 0 | Status: SHIPPED | OUTPATIENT
Start: 2024-09-30

## 2024-10-01 ENCOUNTER — TELEPHONE (OUTPATIENT)
Age: 59
End: 2024-10-01
Payer: COMMERCIAL

## 2024-10-01 NOTE — TELEPHONE ENCOUNTER
I spoke with the patient regarding her recent urine culture results.  I explained that the E. coli would have been covered by the antibiotics given to her by the urgent care.  The Klebsiella on the other hand was intermediate resistance to both the Augmentin and nitrofurantoin.  She is still having some burning with urination and UTI symptoms.  According to the culture results and her allergy list the only other p.o. medications that would be appropriate would be flouroquinolones.  She was told by her pharmacist not to take fluoroquinolones while on Seroquel but she had no explanation of why.    I explained to the patient without knowing that information I really do not know what the concern would be and I feel it would be best appropriate for her primary care doctor to review her meds to see if she could be cleared to take fluoroquinolones while on Seroquel.  Otherwise she would likely need to see someone for IV/IM treatment.  So I recommended that she contact her primary care to discuss those concerns.  She said she would contact the primary care.

## 2024-10-18 DIAGNOSIS — K21.9 GASTROESOPHAGEAL REFLUX DISEASE, UNSPECIFIED WHETHER ESOPHAGITIS PRESENT: ICD-10-CM

## 2024-10-18 RX ORDER — PANTOPRAZOLE SODIUM 40 MG/1
TABLET, DELAYED RELEASE ORAL
Qty: 90 TABLET | Refills: 3 | Status: SHIPPED | OUTPATIENT
Start: 2024-10-18

## 2024-10-25 DIAGNOSIS — S72.402G CLOSED FRACTURE OF DISTAL END OF LEFT FEMUR WITH DELAYED HEALING, UNSPECIFIED FRACTURE MORPHOLOGY, SUBSEQUENT ENCOUNTER: ICD-10-CM

## 2024-10-25 RX ORDER — METHOCARBAMOL 500 MG/1
500 TABLET, FILM COATED ORAL 3 TIMES DAILY PRN
Qty: 90 TABLET | Refills: 0 | Status: SHIPPED | OUTPATIENT
Start: 2024-10-25

## 2024-10-25 NOTE — TELEPHONE ENCOUNTER
PATIENT CALLED FOR MEDICATION REFILL OF  methocarbamol (ROBAXIN) 500 MG tablet   SHE IS OUT OF MEDICATION    [facility]/pharmacy #[id number] - [place], [place] - [address] - [phone]  - [phone]  [phone]     CALL BACK NUMBER [phone]

## 2024-11-19 DIAGNOSIS — S72.402G CLOSED FRACTURE OF DISTAL END OF LEFT FEMUR WITH DELAYED HEALING, UNSPECIFIED FRACTURE MORPHOLOGY, SUBSEQUENT ENCOUNTER: ICD-10-CM

## 2024-11-19 NOTE — TELEPHONE ENCOUNTER
LOV       7/30/2024  NOV           None  LF              methocarbamol:  10/25/2024                    Hydrocodone:  9/25/2024

## 2024-11-19 NOTE — TELEPHONE ENCOUNTER
Caller: Erin Bailey PALAK    Relationship: Self    Best call back number: [phone]     Requested Prescriptions:   Requested Prescriptions     Pending Prescriptions Disp Refills    methocarbamol (ROBAXIN) 500 MG tablet 90 tablet 0     Sig: Take 1 tablet by mouth 3 (Three) Times a Day As Needed for Muscle Spasms. Work to wean from use if possible.    HYDROcodone-acetaminophen (NORCO)  MG per tablet 90 tablet 0     Sig: Take 1 tablet by mouth Every 8 (Eight) Hours As Needed for Moderate Pain or Severe Pain. Take only as needed. Then next month 5 mg tablet every 8 hours.        Pharmacy where request should be sent: [facility]/PHARMACY #[id number] - [place], [place] - [address] - [phone]  - [phone] FX     Last office visit with prescribing clinician: 7/30/2024   Last telemedicine visit with prescribing clinician: Visit date not found   Next office visit with prescribing clinician: Visit date not found     Additional details provided by patient: OUT OF METHOCARBAMOL     Does the patient have less than a 3 day supply:  [x] Yes  [] No    Would you like a call back once the refill request has been completed: [] Yes [x] No    If the office needs to give you a call back, can they leave a voicemail: [] Yes [x] No    Harjinder Church Rep   11/19/24 13:23 EST

## 2024-11-21 RX ORDER — HYDROCODONE BITARTRATE AND ACETAMINOPHEN 10; 325 MG/1; MG/1
1 TABLET ORAL EVERY 8 HOURS PRN
Qty: 90 TABLET | Refills: 0 | Status: SHIPPED | OUTPATIENT
Start: 2024-11-21

## 2024-11-21 RX ORDER — METHOCARBAMOL 500 MG/1
500 TABLET, FILM COATED ORAL 3 TIMES DAILY PRN
Qty: 90 TABLET | Refills: 0 | Status: SHIPPED | OUTPATIENT
Start: 2024-11-21

## 2024-11-22 DIAGNOSIS — I48.92 ATRIAL FLUTTER, UNSPECIFIED TYPE: ICD-10-CM

## 2024-11-22 DIAGNOSIS — S72.402G CLOSED FRACTURE OF DISTAL END OF LEFT FEMUR WITH DELAYED HEALING, UNSPECIFIED FRACTURE MORPHOLOGY, SUBSEQUENT ENCOUNTER: ICD-10-CM

## 2024-11-22 RX ORDER — APIXABAN 5 MG/1
5 TABLET, FILM COATED ORAL EVERY 12 HOURS
Qty: 180 TABLET | Refills: 0 | Status: SHIPPED | OUTPATIENT
Start: 2024-11-22

## 2024-11-22 RX ORDER — METHOCARBAMOL 500 MG/1
500 TABLET, FILM COATED ORAL 3 TIMES DAILY PRN
Qty: 90 TABLET | Refills: 0 | OUTPATIENT
Start: 2024-11-22

## 2024-11-25 DIAGNOSIS — E11.9 TYPE 2 DIABETES MELLITUS WITHOUT COMPLICATION, WITHOUT LONG-TERM CURRENT USE OF INSULIN: ICD-10-CM

## 2024-11-25 DIAGNOSIS — G47.00 INSOMNIA, UNSPECIFIED TYPE: ICD-10-CM

## 2024-11-25 NOTE — TELEPHONE ENCOUNTER
LOV       7/30/2024  NOV           None  LF              pioglitazone:  07/23/2024                    Quetiapine:  06/10/2024

## 2024-11-26 RX ORDER — QUETIAPINE 300 MG/1
300 TABLET, FILM COATED, EXTENDED RELEASE ORAL
Qty: 90 TABLET | Refills: 1 | Status: SHIPPED | OUTPATIENT
Start: 2024-11-26

## 2024-11-26 RX ORDER — PIOGLITAZONE 15 MG/1
15 TABLET ORAL DAILY
Qty: 90 TABLET | Refills: 1 | Status: SHIPPED | OUTPATIENT
Start: 2024-11-26

## 2024-12-16 DIAGNOSIS — G25.81 RESTLESS LEG SYNDROME: ICD-10-CM

## 2024-12-16 DIAGNOSIS — S72.402G CLOSED FRACTURE OF DISTAL END OF LEFT FEMUR WITH DELAYED HEALING, UNSPECIFIED FRACTURE MORPHOLOGY, SUBSEQUENT ENCOUNTER: ICD-10-CM

## 2024-12-16 DIAGNOSIS — G47.00 INSOMNIA, UNSPECIFIED TYPE: ICD-10-CM

## 2024-12-16 NOTE — TELEPHONE ENCOUNTER
Caller: Erin Bailey PALAK    Relationship: Self    Best call back number: [phone]    Requested Prescriptions:   Requested Prescriptions     Pending Prescriptions Disp Refills    methocarbamol (ROBAXIN) 500 MG tablet 90 tablet 0     Sig: Take 1 tablet by mouth 3 (Three) Times a Day As Needed for Muscle Spasms. Work to wean from use if possible.    QUEtiapine XR (SEROquel XR) 300 MG 24 hr tablet 90 tablet 1     Sig: Take 1 tablet by mouth every night at bedtime.    rOPINIRole (REQUIP) 1 MG tablet 180 tablet 1     Sig: Take 2 tablets by mouth Every Night.        Pharmacy where request should be sent: [facility]/PHARMACY #[id number] - [place], [place] - [address] [facility] [phone] [facility] [phone] FX     Last office visit with prescribing clinician: 7/30/2024   Last telemedicine visit with prescribing clinician: Visit date not found   Next office visit with prescribing clinician: 1/8/2025     Additional details provided by patient: patient sated needed new scripts sent to [facility]    Does the patient have less than a 3 day supply:  [] Yes  [x] No    Would you like a call back once the refill request has been completed: [] Yes [x] No    If the office needs to give you a call back, can they leave a voicemail: [] Yes [x] No    Harjinder Morse Rep   12/16/24 16:24 EST

## 2024-12-17 RX ORDER — QUETIAPINE 300 MG/1
300 TABLET, FILM COATED, EXTENDED RELEASE ORAL
Qty: 90 TABLET | Refills: 1 | Status: SHIPPED | OUTPATIENT
Start: 2024-12-17

## 2024-12-17 RX ORDER — ROPINIROLE 1 MG/1
2 TABLET, FILM COATED ORAL NIGHTLY
Qty: 180 TABLET | Refills: 1 | Status: SHIPPED | OUTPATIENT
Start: 2024-12-17

## 2024-12-17 RX ORDER — METHOCARBAMOL 500 MG/1
500 TABLET, FILM COATED ORAL 3 TIMES DAILY PRN
Qty: 90 TABLET | Refills: 0 | Status: SHIPPED | OUTPATIENT
Start: 2024-12-17

## 2024-12-17 NOTE — TELEPHONE ENCOUNTER
LOV       7/30/2024  NOV           1/8/2025  LF              methocarbamol: 11/21/2024                    Ropinirole:  7/23/2024                     Quetiapine:  11/26/2024

## 2025-01-08 ENCOUNTER — OFFICE VISIT (OUTPATIENT)
Dept: FAMILY MEDICINE CLINIC | Facility: CLINIC | Age: 60
End: 2025-01-08
Payer: COMMERCIAL

## 2025-01-08 VITALS
TEMPERATURE: 97.8 F | BODY MASS INDEX: 45.99 KG/M2 | HEART RATE: 74 BPM | WEIGHT: 293 LBS | HEIGHT: 67 IN | OXYGEN SATURATION: 97 % | SYSTOLIC BLOOD PRESSURE: 122 MMHG | DIASTOLIC BLOOD PRESSURE: 80 MMHG

## 2025-01-08 DIAGNOSIS — Z23 ENCOUNTER FOR IMMUNIZATION: ICD-10-CM

## 2025-01-08 DIAGNOSIS — G25.81 RESTLESS LEG SYNDROME: ICD-10-CM

## 2025-01-08 DIAGNOSIS — E11.9 TYPE 2 DIABETES MELLITUS WITHOUT COMPLICATION, WITHOUT LONG-TERM CURRENT USE OF INSULIN: ICD-10-CM

## 2025-01-08 DIAGNOSIS — M17.11 ARTHRITIS OF RIGHT KNEE: Primary | ICD-10-CM

## 2025-01-08 DIAGNOSIS — I10 PRIMARY HYPERTENSION: ICD-10-CM

## 2025-01-08 DIAGNOSIS — G43.109 MIGRAINE WITH AURA AND WITHOUT STATUS MIGRAINOSUS, NOT INTRACTABLE: ICD-10-CM

## 2025-01-08 DIAGNOSIS — E78.5 HYPERLIPIDEMIA, UNSPECIFIED HYPERLIPIDEMIA TYPE: ICD-10-CM

## 2025-01-08 DIAGNOSIS — D50.9 IRON DEFICIENCY ANEMIA, UNSPECIFIED IRON DEFICIENCY ANEMIA TYPE: ICD-10-CM

## 2025-01-08 DIAGNOSIS — R63.5 WEIGHT GAIN: ICD-10-CM

## 2025-01-08 DIAGNOSIS — R79.0 LOW MAGNESIUM LEVEL: ICD-10-CM

## 2025-01-08 DIAGNOSIS — G47.00 INSOMNIA, UNSPECIFIED TYPE: ICD-10-CM

## 2025-01-08 DIAGNOSIS — K21.9 GASTROESOPHAGEAL REFLUX DISEASE, UNSPECIFIED WHETHER ESOPHAGITIS PRESENT: ICD-10-CM

## 2025-01-08 DIAGNOSIS — S72.402G CLOSED FRACTURE OF DISTAL END OF LEFT FEMUR WITH DELAYED HEALING, UNSPECIFIED FRACTURE MORPHOLOGY, SUBSEQUENT ENCOUNTER: ICD-10-CM

## 2025-01-08 DIAGNOSIS — I48.92 ATRIAL FLUTTER, UNSPECIFIED TYPE: ICD-10-CM

## 2025-01-08 PROCEDURE — 99396 PREV VISIT EST AGE 40-64: CPT | Performed by: STUDENT IN AN ORGANIZED HEALTH CARE EDUCATION/TRAINING PROGRAM

## 2025-01-08 PROCEDURE — 90480 ADMN SARSCOV2 VAC 1/ONLY CMP: CPT | Performed by: STUDENT IN AN ORGANIZED HEALTH CARE EDUCATION/TRAINING PROGRAM

## 2025-01-08 PROCEDURE — 91320 SARSCV2 VAC 30MCG TRS-SUC IM: CPT | Performed by: STUDENT IN AN ORGANIZED HEALTH CARE EDUCATION/TRAINING PROGRAM

## 2025-01-08 PROCEDURE — 99214 OFFICE O/P EST MOD 30 MIN: CPT | Performed by: STUDENT IN AN ORGANIZED HEALTH CARE EDUCATION/TRAINING PROGRAM

## 2025-01-08 RX ORDER — METOPROLOL SUCCINATE 50 MG/1
50 TABLET, EXTENDED RELEASE ORAL DAILY
Qty: 90 TABLET | Refills: 3 | Status: SHIPPED | OUTPATIENT
Start: 2025-01-08

## 2025-01-08 RX ORDER — METHENAMINE, BENZOIC ACID, PHENYL SALICYLATE, METHYLENE BLUE, AND HYOSCYAMINE SULFATE 9; .12; 81.6; 10.8; 36.2 MG/1; MG/1; MG/1; MG/1; MG/1
TABLET, COATED ORAL
COMMUNITY
Start: 2024-10-18 | End: 2025-01-08

## 2025-01-08 RX ORDER — PANTOPRAZOLE SODIUM 40 MG/1
40 TABLET, DELAYED RELEASE ORAL DAILY
Qty: 90 TABLET | Refills: 3 | Status: SHIPPED | OUTPATIENT
Start: 2025-01-08

## 2025-01-08 RX ORDER — ONDANSETRON 4 MG/1
4 TABLET, ORALLY DISINTEGRATING ORAL EVERY 8 HOURS PRN
Qty: 12 TABLET | Refills: 0 | Status: SHIPPED | OUTPATIENT
Start: 2025-01-08

## 2025-01-08 RX ORDER — ROPINIROLE 1 MG/1
2 TABLET, FILM COATED ORAL NIGHTLY
Qty: 180 TABLET | Refills: 1 | Status: SHIPPED | OUTPATIENT
Start: 2025-01-08

## 2025-01-08 RX ORDER — ATORVASTATIN CALCIUM 20 MG/1
20 TABLET, FILM COATED ORAL NIGHTLY
Qty: 90 TABLET | Refills: 3 | Status: SHIPPED | OUTPATIENT
Start: 2025-01-08

## 2025-01-08 RX ORDER — METHOCARBAMOL 500 MG/1
500 TABLET, FILM COATED ORAL 3 TIMES DAILY PRN
Qty: 90 TABLET | Refills: 0 | Status: SHIPPED | OUTPATIENT
Start: 2025-01-08

## 2025-01-08 RX ORDER — FUROSEMIDE 40 MG/1
40 TABLET ORAL DAILY PRN
Qty: 135 TABLET | Refills: 1 | Status: SHIPPED | OUTPATIENT
Start: 2025-01-08

## 2025-01-08 RX ORDER — QUETIAPINE 300 MG/1
300 TABLET, FILM COATED, EXTENDED RELEASE ORAL
Qty: 90 TABLET | Refills: 1 | Status: SHIPPED | OUTPATIENT
Start: 2025-01-08

## 2025-01-08 RX ORDER — SUMATRIPTAN 50 MG/1
50 TABLET, FILM COATED ORAL ONCE AS NEEDED
Qty: 12 TABLET | Refills: 1 | Status: SHIPPED | OUTPATIENT
Start: 2025-01-08

## 2025-01-08 RX ORDER — HYDROCODONE BITARTRATE AND ACETAMINOPHEN 10; 325 MG/1; MG/1
1 TABLET ORAL EVERY 8 HOURS PRN
Qty: 90 TABLET | Refills: 0 | Status: SHIPPED | OUTPATIENT
Start: 2025-01-08

## 2025-01-08 RX ORDER — PIOGLITAZONE 15 MG/1
15 TABLET ORAL DAILY
Qty: 90 TABLET | Refills: 1 | Status: SHIPPED | OUTPATIENT
Start: 2025-01-08

## 2025-01-08 NOTE — PROGRESS NOTES
"Chief Complaint  Med Refill    Subjective        Erin Bailey presents to [facility] PRIMARY CARE  History of Present Illness    Here today for follow up.   Has lost 46 lbs. Wants to continue with weight loss. Got mounjaro filled before her accident and did not start the medication. Would like to start mounjaro now.   Will need to stay on her eliquis permanently.     Having pain in her knees. Right knee is waking her up in pain. Ortho she is seeing now doesn't do shots.     Feels like last injection did help for about three months.     COLORECTAL CANCER SCREENING Never done  BMI FOLLOWUP due on 08/05/2022  ANNUAL PHYSICAL due on 05/10/2023  PAP SMEAR due on 10/06/2023  DIABETIC EYE EXAM due on 12/21/2023  INFLUENZA VACCINE due on 07/01/2024  COVID-19 Vaccine(5 - 2024-25 season) due on 09/01/2024  TDAP/TD VACCINES(2 - Td or Tdap) due on 10/01/2024  HEMOGLOBIN A1C due on 01/01/2025  LIPID PANEL due on 07/01/2025  MAMMOGRAM due on 08/30/2026  HEPATITIS C SCREENING Completed  Pneumococcal Vaccine 0-64 Completed  ZOSTER VACCINE Completed  URINE MICROALBUMIN Discontinued  HEALTH RISK ASSESSMENT END        Objective   Vital Signs:  /80 (BP Location: Left arm, Patient Position: Sitting, Cuff Size: Adult)   Pulse 74   Temp 97.8 °F (36.6 °C)   Ht 170.2 cm (67\")   Wt (!) 151 kg (332 lb)   SpO2 97%   BMI 52.00 kg/m²   Estimated body mass index is 52 kg/m² as calculated from the following:    Height as of this encounter: 170.2 cm (67\").    Weight as of this encounter: 151 kg (332 lb).          Physical Exam  Vitals and nursing note reviewed.   Constitutional:       General: She is not in acute distress.     Appearance: Normal appearance. She is not ill-appearing.   HENT:      Head: Normocephalic and atraumatic.      Nose: Nose normal.      Mouth/Throat:      Mouth: Mucous membranes are moist.   Eyes:      Extraocular Movements: Extraocular movements intact.      Conjunctiva/sclera: Conjunctivae " normal.   Cardiovascular:      Rate and Rhythm: Normal rate and regular rhythm.      Heart sounds: Normal heart sounds. No murmur heard.     No gallop.   Pulmonary:      Effort: Pulmonary effort is normal. No respiratory distress.      Breath sounds: Normal breath sounds. No stridor. No wheezing, rhonchi or rales.   Chest:      Chest wall: No tenderness.   Skin:     General: Skin is warm and dry.   Neurological:      General: No focal deficit present.      Mental Status: She is alert and oriented to person, place, and time. Mental status is at baseline.   Psychiatric:         Mood and Affect: Mood normal.         Behavior: Behavior normal.        Result Review :                Assessment and Plan   Diagnoses and all orders for this visit:    1. Arthritis of right knee (Primary)  -     Ambulatory Referral to Orthopedic Surgery    2. Weight gain  -     ondansetron ODT (ZOFRAN-ODT) 4 MG disintegrating tablet; Place 1 tablet on the tongue Every 8 (Eight) Hours As Needed for Nausea or Vomiting.  Dispense: 12 tablet; Refill: 0    3. Migraine with aura and without status migrainosus, not intractable  -     SUMAtriptan (IMITREX) 50 MG tablet; Take 1 tablet by mouth 1 (One) Time As Needed for Migraine for up to 15 doses.  Dispense: 12 tablet; Refill: 1    4. Restless leg syndrome  -     rOPINIRole (REQUIP) 1 MG tablet; Take 2 tablets by mouth Every Night.  Dispense: 180 tablet; Refill: 1    5. Insomnia, unspecified type  -     QUEtiapine XR (SEROquel XR) 300 MG 24 hr tablet; Take 1 tablet by mouth every night at bedtime.  Dispense: 90 tablet; Refill: 1    6. Gastroesophageal reflux disease, unspecified whether esophagitis present  -     pantoprazole (PROTONIX) 40 MG EC tablet; Take 1 tablet by mouth Daily.  Dispense: 90 tablet; Refill: 3    7. Type 2 diabetes mellitus without complication, without long-term current use of insulin  -     pioglitazone (ACTOS) 15 MG tablet; Take 1 tablet by mouth Daily.  Dispense: 90 tablet;  Refill: 1  -     Cancel: Hemoglobin A1c  -     Cancel: Comprehensive metabolic panel  -     Cancel: MicroAlbumin, Urine, Random - Urine, Clean Catch  -     Hemoglobin A1c  -     MicroAlbumin, Urine, Random - Urine, Clean Catch  -     Comprehensive metabolic panel    8. Hyperlipidemia, unspecified hyperlipidemia type  -     atorvastatin (LIPITOR) 20 MG tablet; Take 1 tablet by mouth Every Night.  Dispense: 90 tablet; Refill: 3    9. Atrial flutter, unspecified type  -     apixaban (Eliquis) 5 MG tablet tablet; Take 1 tablet by mouth Every 12 (Twelve) Hours.  Dispense: 180 tablet; Refill: 0  -     metoprolol succinate XL (TOPROL-XL) 50 MG 24 hr tablet; Take 1 tablet by mouth Daily.  Dispense: 90 tablet; Refill: 3    10. Primary hypertension  -     furosemide (LASIX) 40 MG tablet; Take 1 tablet by mouth Daily As Needed (swelling). Takes 1 1/2 tablets daily  Dispense: 135 tablet; Refill: 1    11. Closed fracture of distal end of left femur with delayed healing, unspecified fracture morphology, subsequent encounter  -     HYDROcodone-acetaminophen (NORCO)  MG per tablet; Take 1 tablet by mouth Every 8 (Eight) Hours As Needed for Moderate Pain or Severe Pain. Take only as needed. Then next month 5 mg tablet every 8 hours.  Dispense: 90 tablet; Refill: 0  -     methocarbamol (ROBAXIN) 500 MG tablet; Take 1 tablet by mouth 3 (Three) Times a Day As Needed for Muscle Spasms. Work to wean from use if possible.  Dispense: 90 tablet; Refill: 0    12. Low magnesium level  -     Cancel: Magnesium  -     Magnesium    13. Iron deficiency anemia, unspecified iron deficiency anemia type  -     CBC w AUTO Differential  -     Cancel: Iron and TIBC  -     Iron and TIBC    14. BMI 50.0-59.9, adult  -     Tirzepatide 2.5 MG/0.5ML solution auto-injector; Inject 2.5 mg under the skin into the appropriate area as directed 1 (One) Time Per Week.  Dispense: 2 mL; Refill: 0    15. Encounter for immunization  -     COVID-19 (Pfizer) 12yrs+  (COMIRNATY)      Here today for annual physical.  Vaccines and cancer screenings discussed.  Preventative health info added to AVS.    Referral to ortho for knee arthritis  Start tirzepatide   Bp well controlled, no changes          Follow Up   No follow-ups on file.  Patient was given instructions and counseling regarding her condition or for health maintenance advice. Please see specific information pulled into the AVS if appropriate.

## 2025-01-09 DIAGNOSIS — E11.21 TYPE 2 DIABETES WITH NEPHROPATHY: Primary | ICD-10-CM

## 2025-01-09 LAB
ALBUMIN SERPL-MCNC: 4.1 G/DL (ref 3.8–4.9)
ALP SERPL-CCNC: 111 IU/L (ref 44–121)
ALT SERPL-CCNC: 8 IU/L (ref 0–32)
AST SERPL-CCNC: 12 IU/L (ref 0–40)
BASOPHILS # BLD AUTO: 0.1 X10E3/UL (ref 0–0.2)
BASOPHILS NFR BLD AUTO: 1 %
BILIRUB SERPL-MCNC: 0.2 MG/DL (ref 0–1.2)
BUN SERPL-MCNC: 22 MG/DL (ref 8–27)
BUN/CREAT SERPL: 20 (ref 12–28)
CALCIUM SERPL-MCNC: 9.5 MG/DL (ref 8.7–10.3)
CHLORIDE SERPL-SCNC: 101 MMOL/L (ref 96–106)
CO2 SERPL-SCNC: 26 MMOL/L (ref 20–29)
CREAT SERPL-MCNC: 1.11 MG/DL (ref 0.57–1)
EGFRCR SERPLBLD CKD-EPI 2021: 57 ML/MIN/1.73
EOSINOPHIL # BLD AUTO: 0.2 X10E3/UL (ref 0–0.4)
EOSINOPHIL NFR BLD AUTO: 3 %
ERYTHROCYTE [DISTWIDTH] IN BLOOD BY AUTOMATED COUNT: 14.5 % (ref 11.7–15.4)
GLOBULIN SER CALC-MCNC: 3.2 G/DL (ref 1.5–4.5)
GLUCOSE SERPL-MCNC: 98 MG/DL (ref 70–99)
HBA1C MFR BLD: 6.3 % (ref 4.8–5.6)
HCT VFR BLD AUTO: 40.6 % (ref 34–46.6)
HGB BLD-MCNC: 12.2 G/DL (ref 11.1–15.9)
IMM GRANULOCYTES # BLD AUTO: 0 X10E3/UL (ref 0–0.1)
IMM GRANULOCYTES NFR BLD AUTO: 0 %
IRON SATN MFR SERPL: 15 % (ref 15–55)
IRON SERPL-MCNC: 48 UG/DL (ref 27–159)
LYMPHOCYTES # BLD AUTO: 2.1 X10E3/UL (ref 0.7–3.1)
LYMPHOCYTES NFR BLD AUTO: 27 %
MAGNESIUM SERPL-MCNC: 2 MG/DL (ref 1.6–2.3)
MCH RBC QN AUTO: 26.8 PG (ref 26.6–33)
MCHC RBC AUTO-ENTMCNC: 30 G/DL (ref 31.5–35.7)
MCV RBC AUTO: 89 FL (ref 79–97)
MICROALBUMIN UR-MCNC: 320.2 UG/ML
MONOCYTES # BLD AUTO: 0.7 X10E3/UL (ref 0.1–0.9)
MONOCYTES NFR BLD AUTO: 9 %
NEUTROPHILS # BLD AUTO: 4.6 X10E3/UL (ref 1.4–7)
NEUTROPHILS NFR BLD AUTO: 60 %
PLATELET # BLD AUTO: 372 X10E3/UL (ref 150–450)
POTASSIUM SERPL-SCNC: 5 MMOL/L (ref 3.5–5.2)
PROT SERPL-MCNC: 7.3 G/DL (ref 6–8.5)
RBC # BLD AUTO: 4.56 X10E6/UL (ref 3.77–5.28)
SODIUM SERPL-SCNC: 141 MMOL/L (ref 134–144)
TIBC SERPL-MCNC: 314 UG/DL (ref 250–450)
UIBC SERPL-MCNC: 266 UG/DL (ref 131–425)
WBC # BLD AUTO: 7.7 X10E3/UL (ref 3.4–10.8)

## 2025-01-09 RX ORDER — DAPAGLIFLOZIN 5 MG/1
5 TABLET, FILM COATED ORAL DAILY
Qty: 30 TABLET | Refills: 0 | Status: SHIPPED | OUTPATIENT
Start: 2025-01-09

## 2025-01-31 DIAGNOSIS — N39.0 URINARY TRACT INFECTION WITHOUT HEMATURIA, SITE UNSPECIFIED: Primary | ICD-10-CM

## 2025-01-31 RX ORDER — NITROFURANTOIN 25; 75 MG/1; MG/1
100 CAPSULE ORAL 2 TIMES DAILY
Qty: 10 CAPSULE | Refills: 0 | Status: SHIPPED | OUTPATIENT
Start: 2025-01-31

## 2025-02-07 DIAGNOSIS — S72.402G CLOSED FRACTURE OF DISTAL END OF LEFT FEMUR WITH DELAYED HEALING, UNSPECIFIED FRACTURE MORPHOLOGY, SUBSEQUENT ENCOUNTER: ICD-10-CM

## 2025-02-07 DIAGNOSIS — N39.0 URINARY TRACT INFECTION WITHOUT HEMATURIA, SITE UNSPECIFIED: ICD-10-CM

## 2025-02-07 RX ORDER — NITROFURANTOIN 25; 75 MG/1; MG/1
100 CAPSULE ORAL 2 TIMES DAILY
Qty: 10 CAPSULE | Refills: 0 | OUTPATIENT
Start: 2025-02-07

## 2025-02-07 RX ORDER — METHOCARBAMOL 500 MG/1
500 TABLET, FILM COATED ORAL 3 TIMES DAILY PRN
Qty: 90 TABLET | Refills: 0 | OUTPATIENT
Start: 2025-02-07

## 2025-02-13 DIAGNOSIS — S72.402G CLOSED FRACTURE OF DISTAL END OF LEFT FEMUR WITH DELAYED HEALING, UNSPECIFIED FRACTURE MORPHOLOGY, SUBSEQUENT ENCOUNTER: ICD-10-CM

## 2025-02-13 RX ORDER — HYDROCODONE BITARTRATE AND ACETAMINOPHEN 10; 325 MG/1; MG/1
1 TABLET ORAL EVERY 8 HOURS PRN
Qty: 90 TABLET | Refills: 0 | Status: SHIPPED | OUTPATIENT
Start: 2025-02-13

## 2025-02-13 RX ORDER — METHOCARBAMOL 500 MG/1
500 TABLET, FILM COATED ORAL 3 TIMES DAILY PRN
Qty: 90 TABLET | Refills: 0 | Status: SHIPPED | OUTPATIENT
Start: 2025-02-13

## 2025-02-13 NOTE — TELEPHONE ENCOUNTER
Caller: Erin Bailey    Relationship: Self    Best call back number: [phone]     Requested Prescriptions:   Requested Prescriptions     Pending Prescriptions Disp Refills    methocarbamol (ROBAXIN) 500 MG tablet 90 tablet 0     Sig: Take 1 tablet by mouth 3 (Three) Times a Day As Needed for Muscle Spasms. Work to wean from use if possible.    HYDROcodone-acetaminophen (NORCO)  MG per tablet 90 tablet 0     Sig: Take 1 tablet by mouth Every 8 (Eight) Hours As Needed for Moderate Pain or Severe Pain. Take only as needed. Then next month 5 mg tablet every 8 hours.        Pharmacy where request should be sent: [facility]/PHARMACY #[id number] - [place], [place] - [address] [facility] - [phone] [facility] [phone] FX     Last office visit with prescribing clinician: 1/8/2025   Last telemedicine visit with prescribing clinician: Visit date not found   Next office visit with prescribing clinician: 4/11/2025     Additional details provided by patient: PATIENT IS OUT OF THE METHOCARBAMOL AND HAS A WEEK LEFT OF THE HYDROCODONE.    PATIENT ALSO WOULD LIKE TO KNOW IF SHE CAN EMAIL [employer] PAPERWORK TO BE FILLED OUT    PLEASE CALL TO ADVISE    Does the patient have less than a 3 day supply:  [x] Yes  [] No      Harjinder Maradiaga Rep   02/13/25 14:50 EST

## 2025-02-13 NOTE — TELEPHONE ENCOUNTER
LOV       1/8/2025  NOV           4/11/2025  LF              hydrocodone:  01/08/2025                     Methocarbamol:  01/08/2025

## 2025-02-21 ENCOUNTER — TELEPHONE (OUTPATIENT)
Dept: FAMILY MEDICINE CLINIC | Facility: CLINIC | Age: 60
End: 2025-02-21
Payer: COMMERCIAL

## 2025-02-21 NOTE — TELEPHONE ENCOUNTER
Patient dropped off [employer] paperwork for Dr. Whitman to fill out for her migraines. This has been left with Fiona. Patient would like to be notified at [phone] when this is faxed or if Dr. Whitman needs any additional information.

## 2025-02-25 ENCOUNTER — TELEPHONE (OUTPATIENT)
Dept: FAMILY MEDICINE CLINIC | Facility: CLINIC | Age: 60
End: 2025-02-25
Payer: COMMERCIAL

## 2025-02-25 NOTE — TELEPHONE ENCOUNTER
PATIENT CALLED ABOUT STATUS OF DISABILITY PAPERWORK.  IT WAS TO BE FAXED BY TOMORROW. SHE IS WANTING TO KNOW IF IT HAS BEEN FAXED TO [employer].     PLEASE CALL AND LEAVE A MESSAGE SHE IS UNABLE TO ANSWER HER PHONE AT WORK    CALL BACK NUMBER [phone]

## 2025-02-25 NOTE — TELEPHONE ENCOUNTER
LVM for pt. About disability paperwork that we do not have.  Told pt, to have this faxed to us and Dr. Whitman will try to make this a priority to fax tomorrow.

## 2025-02-28 ENCOUNTER — TELEPHONE (OUTPATIENT)
Dept: FAMILY MEDICINE CLINIC | Facility: CLINIC | Age: 60
End: 2025-02-28

## 2025-02-28 NOTE — TELEPHONE ENCOUNTER
Caller: Erin Bailey    Relationship to patient: Self    Best call back number: [phone]     Patient is needing: PATIENT WOULD LIKE TO KNOW IF THERE IS AN ALTERNATIVE SHE CAN BE PRESCRIBED TO THE [facility].  SHE STATES SINCE STARTING IT, SHE HAS TO GO URINATE EVERY 90 MINUTES TO 2 HOURS EVEN THROUGH THE NIGHT.  PATIENT STATES FREQUENT URINATION IS LISTED AS A SIDE EFFECT OF THIS MEDICATION.    PLEASE ADVISE.

## 2025-03-01 DIAGNOSIS — S72.402G CLOSED FRACTURE OF DISTAL END OF LEFT FEMUR WITH DELAYED HEALING, UNSPECIFIED FRACTURE MORPHOLOGY, SUBSEQUENT ENCOUNTER: ICD-10-CM

## 2025-03-03 RX ORDER — METHOCARBAMOL 500 MG/1
500 TABLET, FILM COATED ORAL 3 TIMES DAILY PRN
Qty: 90 TABLET | Refills: 0 | Status: SHIPPED | OUTPATIENT
Start: 2025-03-03

## 2025-03-11 RX ORDER — TIRZEPATIDE 2.5 MG/.5ML
INJECTION, SOLUTION SUBCUTANEOUS
Qty: 2 ML | Refills: 1 | Status: SHIPPED | OUTPATIENT
Start: 2025-03-11

## 2025-03-11 NOTE — TELEPHONE ENCOUNTER
LOV                   1/8/2025  NOV                   4/11/2025  Last refill             1/8/25  Protocol              met

## 2025-03-19 ENCOUNTER — TELEPHONE (OUTPATIENT)
Dept: FAMILY MEDICINE CLINIC | Facility: CLINIC | Age: 60
End: 2025-03-19

## 2025-03-19 DIAGNOSIS — N39.0 URINARY TRACT INFECTION WITHOUT HEMATURIA, SITE UNSPECIFIED: Primary | ICD-10-CM

## 2025-03-19 RX ORDER — CIPROFLOXACIN 500 MG/1
500 TABLET, FILM COATED ORAL 2 TIMES DAILY
Qty: 14 TABLET | Refills: 0 | Status: SHIPPED | OUTPATIENT
Start: 2025-03-19

## 2025-03-19 NOTE — TELEPHONE ENCOUNTER
Caller: Erin Bailey    Relationship: Self    Best call back number: [phone]     What medication are you requesting: ANTIBIOTICS ( PREFERABLY CIPROFLOXACIN)    What are your current symptoms: URGENCY, FREQUENCY, BURNING ALL WITH URINATION; CLOUDY URINE    How long have you been experiencing symptoms: 1 WEEK    If a prescription is needed, what is your preferred pharmacy and phone number: [facility]ACY #[id number] - [place], [place] - [address] [facility] [phone] [facility] [phone]      Additional notes:  PLEASE CALL TO ADVISE

## 2025-04-07 DIAGNOSIS — E11.9 TYPE 2 DIABETES MELLITUS WITHOUT COMPLICATION, WITHOUT LONG-TERM CURRENT USE OF INSULIN: ICD-10-CM

## 2025-04-07 DIAGNOSIS — I10 PRIMARY HYPERTENSION: ICD-10-CM

## 2025-04-08 DIAGNOSIS — S72.402G CLOSED FRACTURE OF DISTAL END OF LEFT FEMUR WITH DELAYED HEALING, UNSPECIFIED FRACTURE MORPHOLOGY, SUBSEQUENT ENCOUNTER: ICD-10-CM

## 2025-04-08 RX ORDER — PIOGLITAZONE 15 MG/1
15 TABLET ORAL DAILY
Qty: 90 TABLET | Refills: 1 | Status: SHIPPED | OUTPATIENT
Start: 2025-04-08

## 2025-04-08 RX ORDER — FUROSEMIDE 40 MG/1
40 TABLET ORAL DAILY PRN
Qty: 135 TABLET | Refills: 1 | Status: SHIPPED | OUTPATIENT
Start: 2025-04-08 | End: 2025-04-11

## 2025-04-08 NOTE — TELEPHONE ENCOUNTER
Caller: Erin Bailey PALAK    Relationship: Self    Best call back number: [phone]     Requested Prescriptions:   Requested Prescriptions     Pending Prescriptions Disp Refills    HYDROcodone-acetaminophen (NORCO)  MG per tablet 90 tablet 0     Sig: Take 1 tablet by mouth Every 8 (Eight) Hours As Needed for Moderate Pain or Severe Pain. Take only as needed. Then next month 5 mg tablet every 8 hours.    methocarbamol (ROBAXIN) 500 MG tablet 90 tablet 0     Sig: Take 1 tablet by mouth 3 (Three) Times a Day As Needed for Muscle Spasms. Work to wean from use if possible.        Pharmacy where request should be sent: [facility]/PHARMACY #[id number] - [place], [place] - [address] [facility] - [phone] [facility] [phone] FX     Last office visit with prescribing clinician: 1/8/2025   Last telemedicine visit with prescribing clinician: Visit date not found   Next office visit with prescribing clinician: 4/11/2025     Additional details provided by patient: 5 DAYS LEFT OF BOTH    Does the patient have less than a 3 day supply:  [] Yes  [x] No    Would you like a call back once the refill request has been completed: [] Yes [x] No    If the office needs to give you a call back, can they leave a voicemail: [] Yes [x] No    Harjinder Church   04/08/25 13:04 EDT

## 2025-04-08 NOTE — TELEPHONE ENCOUNTER
LOV       1/8/2025  NOV           4/11/2025  LF              hydrocodone:  2/13/2025                    Methocarbamol:  3/03/2025

## 2025-04-09 RX ORDER — HYDROCODONE BITARTRATE AND ACETAMINOPHEN 10; 325 MG/1; MG/1
1 TABLET ORAL EVERY 8 HOURS PRN
Qty: 90 TABLET | Refills: 0 | Status: SHIPPED | OUTPATIENT
Start: 2025-04-09

## 2025-04-09 RX ORDER — METHOCARBAMOL 500 MG/1
500 TABLET, FILM COATED ORAL 3 TIMES DAILY PRN
Qty: 90 TABLET | Refills: 0 | Status: SHIPPED | OUTPATIENT
Start: 2025-04-09

## 2025-04-11 ENCOUNTER — OFFICE VISIT (OUTPATIENT)
Dept: FAMILY MEDICINE CLINIC | Facility: CLINIC | Age: 60
End: 2025-04-11
Payer: COMMERCIAL

## 2025-04-11 VITALS
OXYGEN SATURATION: 99 % | HEIGHT: 67 IN | SYSTOLIC BLOOD PRESSURE: 128 MMHG | DIASTOLIC BLOOD PRESSURE: 82 MMHG | TEMPERATURE: 97.7 F | BODY MASS INDEX: 45.99 KG/M2 | HEART RATE: 80 BPM | WEIGHT: 293 LBS

## 2025-04-11 DIAGNOSIS — G89.29 CHRONIC PAIN OF BOTH KNEES: ICD-10-CM

## 2025-04-11 DIAGNOSIS — M25.562 CHRONIC PAIN OF BOTH KNEES: ICD-10-CM

## 2025-04-11 DIAGNOSIS — Z12.11 SCREEN FOR COLON CANCER: ICD-10-CM

## 2025-04-11 DIAGNOSIS — Z23 ENCOUNTER FOR IMMUNIZATION: ICD-10-CM

## 2025-04-11 DIAGNOSIS — M25.561 CHRONIC PAIN OF BOTH KNEES: ICD-10-CM

## 2025-04-11 DIAGNOSIS — N39.0 URINARY TRACT INFECTION WITHOUT HEMATURIA, SITE UNSPECIFIED: ICD-10-CM

## 2025-04-11 DIAGNOSIS — E11.43 DIABETIC AUTONOMIC NEUROPATHY ASSOCIATED WITH TYPE 2 DIABETES MELLITUS: Primary | ICD-10-CM

## 2025-04-11 DIAGNOSIS — R63.5 WEIGHT GAIN: ICD-10-CM

## 2025-04-11 PROBLEM — S72.433A: Status: ACTIVE | Noted: 2025-01-05
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
## 2025-04-11 PROBLEM — E66.01 MORBID OBESITY: Status: ACTIVE | Noted: 2024-07-31

## 2025-04-11 PROBLEM — F39 MOOD DISORDER: Status: ACTIVE | Noted: 2024-07-31

## 2025-04-11 PROBLEM — S72.90XA CLOSED FRACTURE OF FEMUR: Status: ACTIVE | Noted: 2024-04-14

## 2025-04-11 PROBLEM — E78.5 HYPERLIPIDEMIA: Status: ACTIVE | Noted: 2024-07-31

## 2025-04-11 PROBLEM — Z88.9 ALLERGIC CONDITION: Status: ACTIVE | Noted: 2020-06-04

## 2025-04-11 PROBLEM — S72.423A: Status: ACTIVE | Noted: 2025-01-05

## 2025-04-11 RX ORDER — ONDANSETRON 4 MG/1
4 TABLET, ORALLY DISINTEGRATING ORAL EVERY 8 HOURS PRN
Qty: 12 TABLET | Refills: 0 | Status: SHIPPED | OUTPATIENT
Start: 2025-04-11

## 2025-04-11 RX ORDER — CIPROFLOXACIN 500 MG/1
500 TABLET, FILM COATED ORAL 2 TIMES DAILY
Qty: 14 TABLET | Refills: 0 | Status: SHIPPED | OUTPATIENT
Start: 2025-04-11

## 2025-04-11 NOTE — PROGRESS NOTES
Chief Complaint  Diabetes    Subjective        Erin Bailey presents to [facility]    History of Present Illness  The patient presents for evaluation of weight management, knee pain, and health maintenance.    She has been on the initial dose of Mounjaro, which she reports as being well-tolerated. She experiences occasional nausea, which serves as a deterrent to overeating. Her weight loss journey began at 338 pounds, with a significant reduction noted during her rehabilitation period where she lost approximately 40 pounds. She is currently seeking an increase in her Mounjaro dosage and prefers the sublingual form of Zofran due to its rapid onset of action. She has a few pills left but is out of the sublingual Zofran. She does not monitor her blood glucose levels at home as her A1c levels have consistently been within the normal range. She had a trial of Farxiga, but it was discontinued due to adverse effects including gastrointestinal upset and frequent urination every 90 minutes, even at night, which disrupted her sleep and work schedule.    She is considering knee injections but is uncertain about the need for x-rays prior to the procedure due to the presence of metal in her left leg. Her last injection was administered approximately 1.5 years ago. She experiences more pain in her right knee compared to her left and is interested in obtaining x-rays to assess the cartilage condition. She has received approximately 4 injections since 2019.    She has not yet completed the Cologuard test due to a recent accident. She is currently on iron supplementation, which has resulted in black stools, and she is concerned about the potential impact on the Cologuard test. She received a pneumonia vaccine in 2014 and has been receiving influenza vaccines annually since then. Her last eye exam was conducted in December 2023, and she is due for another one. She received a tetanus vaccine in 2014  "and has completed the COVID-19 vaccine series, with the exception of one dose that was missed due to hospitalization. Her last Pap smear was performed in 2022.    She continues to experience recurrent urinary tract infections (UTIs).    MEDICATIONS  Current: Mounjaro, Zofran, iron  Past: Farxiga    IMMUNIZATIONS  She received a pneumonia vaccine in 2014 and has been doing influenza vaccines ever since. She has had all of the COVID-19 vaccines except for one.       Answers submitted by the patient for this visit:  Problem not listed (Submitted on 4/10/2025)  Chief Complaint: Other medical problem  Reason for appointment: Check up  abdominal pain: No  anorexia: No  joint pain: Yes  change in stool: No  chest pain: No  chills: No  nasal congestion: Yes  cough: No  diaphoresis: No  fatigue: No  fever: No  headaches: No  joint swelling: No  myalgias: Yes  nausea: No  neck pain: No  numbness: No  rash: No  sore throat: No  swollen glands: No  dysuria: No  vertigo: No  visual change: No  vomiting: No  weakness: No  Onset: at an unknown time  Chronicity: chronic  Frequency: intermittently    Pneumococcal Vaccine 50+(1 of 2 - PCV) Never done  COLORECTAL CANCER SCREENING Never done  ANNUAL PHYSICAL due on 05/10/2023  PAP SMEAR due on 10/06/2023  URINE MICROALBUMIN-CREATININE RATIO (uACR) due on 11/15/2023  TDAP/TD VACCINES(2 - Td or Tdap) due on 10/01/2024  DIABETIC EYE EXAM due on 12/20/2024  HEMOGLOBIN A1C due on 07/08/2025  INFLUENZA VACCINE due on 07/01/2025  LIPID PANEL due on 07/01/2025  MAMMOGRAM due on 08/30/2026  HEPATITIS C SCREENING Completed  COVID-19 Vaccine Completed  ZOSTER VACCINE Completed  HEALTH RISK ASSESSMENT END        Objective   Vital Signs:  /82 (BP Location: Left arm, Patient Position: Sitting, Cuff Size: Adult)   Pulse 80   Temp 97.7 °F (36.5 °C)   Ht 170.2 cm (67\")   Wt (!) 146 kg (321 lb)   SpO2 99%   BMI 50.28 kg/m²   Estimated body mass index is 50.28 kg/m² as calculated from the " "following:    Height as of this encounter: 170.2 cm (67\").    Weight as of this encounter: 146 kg (321 lb).            Physical Exam  Vitals and nursing note reviewed.   Constitutional:       General: She is not in acute distress.     Appearance: Normal appearance. She is not ill-appearing.   HENT:      Head: Normocephalic and atraumatic.      Nose: Nose normal.      Mouth/Throat:      Mouth: Mucous membranes are moist.   Eyes:      Extraocular Movements: Extraocular movements intact.      Conjunctiva/sclera: Conjunctivae normal.   Cardiovascular:      Rate and Rhythm: Normal rate and regular rhythm.      Heart sounds: Normal heart sounds. No murmur heard.     No gallop.   Pulmonary:      Effort: Pulmonary effort is normal. No respiratory distress.      Breath sounds: Normal breath sounds. No stridor. No wheezing, rhonchi or rales.   Chest:      Chest wall: No tenderness.   Skin:     General: Skin is warm and dry.   Neurological:      General: No focal deficit present.      Mental Status: She is alert and oriented to person, place, and time. Mental status is at baseline.   Psychiatric:         Mood and Affect: Mood normal.         Behavior: Behavior normal.          Physical Exam  Lungs were auscultated.       Result Review :               Results  Laboratory Studies  Kidney function improved as of January 2025.          Assessment and Plan   Diagnoses and all orders for this visit:    1. Diabetic autonomic neuropathy associated with type 2 diabetes mellitus (Primary)  -     Tirzepatide 5 MG/0.5ML solution auto-injector; Inject 5 mg under the skin into the appropriate area as directed 1 (One) Time Per Week.  Dispense: 2 mL; Refill: 1  -     Hemoglobin A1c  -     Microalbumin / Creatinine Urine Ratio - Urine, Clean Catch    2. BMI 50.0-59.9, adult  -     Tirzepatide 5 MG/0.5ML solution auto-injector; Inject 5 mg under the skin into the appropriate area as directed 1 (One) Time Per Week.  Dispense: 2 mL; Refill: " 1    3. Weight gain  -     ondansetron ODT (ZOFRAN-ODT) 4 MG disintegrating tablet; Place 1 tablet on the tongue Every 8 (Eight) Hours As Needed for Nausea or Vomiting.  Dispense: 12 tablet; Refill: 0    4. Screen for colon cancer  -     Cologuard - Stool, Per Rectum; Future    5. Encounter for immunization  -     Pneumococcal Conjugate Vaccine 20-Valent (PCV20)  -     Tdap Vaccine => 6yo IM (BOOSTRIX/ADACEL)    6. Chronic pain of both knees  -     XR Knee 1 or 2 View Bilateral (In Office)    7. Urinary tract infection without hematuria, site unspecified  -     ciprofloxacin (Cipro) 500 MG tablet; Take 1 tablet by mouth 2 (Two) Times a Day.  Dispense: 14 tablet; Refill: 0        Assessment & Plan  1. Weight management.  She has experienced a weight loss of 11 pounds, with her initial weight recorded at 338 pounds. The highest weight recorded in [facility] is 366 pounds. Her kidney function, as per the last evaluation in January 2025, showed improvement. The Mounjaro dosage will be increased, and a prescription for Zofran sublingual will be provided. She is advised to communicate any side effects or concerns via message.    2. Knee pain.  She reports more pain in her right knee compared to her left. She has had approximately 4 injections since 2019, with the last one administered about a year and a half ago. An x-ray of the knee will be ordered to assess the current state of the joint and cartilage. If the x-ray can not be done today, she will be scheduled for a procedure visit to administer the injection and review the images.    3. Health maintenance.  She has not yet completed the Cologuard test due to a recent accident. She is currently on iron supplementation, which has resulted in black stools, and she is concerned about the potential impact on the Cologuard test. She received a pneumonia vaccine in 2014 and has been receiving influenza vaccines annually since then. Her last eye exam was conducted in December  2023, and she is due for another one. She received a tetanus vaccine in 2014 and has completed the COVID-19 vaccine series, with the exception of one dose that was missed due to hospitalization. Her last Pap smear was performed in 2022. A new order for Cologuard will be placed. She is recommended to receive the Prevnar 20 vaccine and a tetanus vaccine.    4. Recurrent urinary tract infections (UTIs).  She continues to experience recurrent UTIs.    PROCEDURE  The patient has received approximately 4 knee injections since 2019, with the last one administered approximately 1.5 years ago.            Follow Up   Return in about 1 week (around 4/18/2025) for 30 min procedure appt, knee injections and xrays same day .  Patient was given instructions and counseling regarding her condition or for health maintenance advice. Please see specific information pulled into the AVS if appropriate.           Patient or patient representative verbalized consent for the use of Ambient Listening during the visit with  Rosy Whitman DO for chart documentation. 4/14/2025  14:37 EDT

## 2025-04-12 LAB
ALBUMIN/CREAT UR: 211 MG/G CREAT (ref 0–29)
CREAT UR-MCNC: 119.1 MG/DL
HBA1C MFR BLD: 6 % (ref 4.8–5.6)
MICROALBUMIN UR-MCNC: 251.4 UG/ML

## 2025-04-16 ENCOUNTER — RESULTS FOLLOW-UP (OUTPATIENT)
Dept: FAMILY MEDICINE CLINIC | Facility: CLINIC | Age: 60
End: 2025-04-16
Payer: COMMERCIAL

## 2025-04-17 NOTE — TELEPHONE ENCOUNTER
LVM. Tried calling about results.    Ok for hub to relay message:  Right knee findings: Joint space loss is seen with bone-on-bone type arthritis in your right knee.  No swelling.  Surrounding soft tissues look okay.  There is a bone spur on your patella.  Left knee findings: Josh is in place.  There are several loose screws.  An old fracture that appears not united any longer.  Some arthritis as well.  I think you should see your orthopedist again for this.

## 2025-04-22 ENCOUNTER — PROCEDURE VISIT (OUTPATIENT)
Dept: FAMILY MEDICINE CLINIC | Facility: CLINIC | Age: 60
End: 2025-04-22
Payer: COMMERCIAL

## 2025-04-22 VITALS
HEIGHT: 67 IN | DIASTOLIC BLOOD PRESSURE: 80 MMHG | SYSTOLIC BLOOD PRESSURE: 126 MMHG | BODY MASS INDEX: 45.99 KG/M2 | OXYGEN SATURATION: 96 % | HEART RATE: 72 BPM | TEMPERATURE: 97.8 F | WEIGHT: 293 LBS

## 2025-04-22 DIAGNOSIS — M17.11 PRIMARY OSTEOARTHRITIS OF RIGHT KNEE: ICD-10-CM

## 2025-04-22 DIAGNOSIS — Z00.00 WELLNESS EXAMINATION: Primary | ICD-10-CM

## 2025-04-22 RX ORDER — TRIAMCINOLONE ACETONIDE 40 MG/ML
40 INJECTION, SUSPENSION INTRA-ARTICULAR; INTRAMUSCULAR
Status: COMPLETED | OUTPATIENT
Start: 2025-04-22 | End: 2025-04-22

## 2025-04-22 RX ORDER — LIDOCAINE HYDROCHLORIDE 10 MG/ML
1 INJECTION, SOLUTION INFILTRATION; PERINEURAL
Status: COMPLETED | OUTPATIENT
Start: 2025-04-22 | End: 2025-04-22

## 2025-04-22 RX ADMIN — TRIAMCINOLONE ACETONIDE 40 MG: 40 INJECTION, SUSPENSION INTRA-ARTICULAR; INTRAMUSCULAR at 16:51

## 2025-04-22 RX ADMIN — LIDOCAINE HYDROCHLORIDE 1 ML: 10 INJECTION, SOLUTION INFILTRATION; PERINEURAL at 16:51

## 2025-04-22 NOTE — PROGRESS NOTES
Chief Complaint  Knee Pain    Subjective        Erin Bailey presents to [facility]    History of Present Illness  The patient presents for evaluation of right knee pain, proteinuria, and health maintenance.    The chief complaint is severe right knee pain, which has been disrupting sleep. The pain is exacerbated by injections, which have previously caused significant discomfort and bleeding. The patient is under the care of Dr. Lamont Blair, an orthopedist, for her right knee. There is a concern about loose screws in the knee, which requires further evaluation by the orthopedist.    Proteinuria is reported, with recent lab results indicating increased protein levels in the urine compared to previous tests. The patient had a negative reaction to Farxiga, leading to its discontinuation. Current medications include metoprolol, which has been effective in managing blood pressure.    Health maintenance includes being due for a Pap smear and awaiting the delivery of a Cologuard test kit for colon cancer screening. The last eye exam was in 12/2023. A foot exam has not been performed in a while.    FAMILY HISTORY  Her mother had been on lisinopril at some point and did okay with it.   COLORECTAL CANCER SCREENING Never done  ANNUAL PHYSICAL due on 05/10/2023  DIABETIC FOOT EXAM due on 05/10/2023  DIABETIC EYE EXAM due on 12/20/2024  PAP SMEAR due on 01/03/2025  INFLUENZA VACCINE due on 07/01/2025  LIPID PANEL due on 07/01/2025  HEMOGLOBIN A1C due on 10/11/2025  URINE MICROALBUMIN-CREATININE RATIO (uACR) due on 04/11/2026  MAMMOGRAM due on 08/30/2026  TDAP/TD VACCINES(3 - Td or Tdap) due on 04/11/2035  HEPATITIS C SCREENING Completed  COVID-19 Vaccine Completed  Pneumococcal Vaccine 50+ Completed  ZOSTER VACCINE Completed        Objective   Vital Signs:  /80 (BP Location: Left arm, Patient Position: Sitting, Cuff Size: Adult)   Pulse 72   Temp 97.8 °F (36.6 °C)   Ht 170.2 cm  "(67\")   Wt (!) 148 kg (327 lb)   SpO2 96%   BMI 51.22 kg/m²   Estimated body mass index is 51.22 kg/m² as calculated from the following:    Height as of this encounter: 170.2 cm (67\").    Weight as of this encounter: 148 kg (327 lb).            Physical Exam     Physical Exam  Extremities: No edema, no cyanosis       Result Review :         Arthrocentesis    Date/Time: 4/22/2025 4:51 PM    Performed by: Rosy Whitman DO  Authorized by: Rosy Whitman DO  Indications: joint swelling and pain   Body area: knee  Joint: right knee  Local anesthesia used: no    Anesthesia:  Local anesthesia used: no    Sedation:  Patient sedated: no    Preparation: Patient was prepped and draped in the usual sterile fashion.  Needle gauge: 21.  Approach: medial  Meds administered: 1 mL lidocaine 1 %; 40 mg triamcinolone acetonide 40 MG/ML  Patient tolerance: patient tolerated the procedure well with no immediate complications           Results  Labs   - A1c: Normal   - Urine protein: Slightly elevated          Assessment and Plan   Diagnoses and all orders for this visit:    1. Wellness examination (Primary)    2. Primary osteoarthritis of right knee    Other orders  -     Arthrocentesis        Assessment & Plan  1. Right knee pain.  - Reports significant pain in the right knee, sometimes waking her up at night.  - Injection of lidocaine and steroid administered today to alleviate pain; potential risks of pain, bleeding, and infection discussed.  - Advised to follow up with orthopedist, Dr. Lamont Blair, to review x-ray images and ensure screws are not loose.  - If orthopedist confirms screws are stable, further treatment options will be considered.    2. Proteinuria.  - Urine test shows increased protein levels, similar to findings from three years ago.  - Medications such as Farxiga, Jardiance, lisinopril, or losartan discussed as potential treatments to protect kidney function.  - Previously had an adverse reaction to " Farxiga; alternative medications will be considered.  - Currently on metoprolol, which is effectively managing blood pressure.    3. Health maintenance.  - Due for a Pap smear and has not yet received the Cologuard test kit that was ordered.  - Last eye exam was in 12/2023.  - Foot exam conducted today.    PROCEDURE  Procedure: Injection of lidocaine and steroid into the right knee    All questions were answered and agreement to proceed was given after the following Pre-Procedure details were reviewed:  - Risks and Benefits: Pain relief, potential for bleeding, infection, and pain at the injection site.  - Side effects: Pain, bleeding, infection.  - Consent: Verbal consent obtained.    Intra-Procedure:  - Site Preparation: The area was cleaned with Betadine.  - Anesthesia: Lidocaine used.  - Medication: Steroid administered.  - Hemostasis: Pressure applied to control bleeding.  - Dressing: Band-Aid applied to the injection site.    Post-Procedure:  - Tolerance Level: Patient tolerated the procedure well.  - Complications: Bleeding noted, controlled with pressure.  - Home Care Instructions: Monitor for signs of infection, avoid strenuous activity.            Follow Up   No follow-ups on file.  Patient was given instructions and counseling regarding her condition or for health maintenance advice. Please see specific information pulled into the AVS if appropriate.           Patient or patient representative verbalized consent for the use of Ambient Listening during the visit with  Rosy Whitman DO for chart documentation. 4/22/2025  16:52 EDT

## 2025-05-12 DIAGNOSIS — S72.402G CLOSED FRACTURE OF DISTAL END OF LEFT FEMUR WITH DELAYED HEALING, UNSPECIFIED FRACTURE MORPHOLOGY, SUBSEQUENT ENCOUNTER: ICD-10-CM

## 2025-05-12 RX ORDER — METHOCARBAMOL 500 MG/1
500 TABLET, FILM COATED ORAL 3 TIMES DAILY PRN
Qty: 90 TABLET | Refills: 0 | Status: SHIPPED | OUTPATIENT
Start: 2025-05-12

## 2025-05-21 DIAGNOSIS — S72.402G CLOSED FRACTURE OF DISTAL END OF LEFT FEMUR WITH DELAYED HEALING, UNSPECIFIED FRACTURE MORPHOLOGY, SUBSEQUENT ENCOUNTER: ICD-10-CM

## 2025-05-21 RX ORDER — HYDROCODONE BITARTRATE AND ACETAMINOPHEN 10; 325 MG/1; MG/1
1 TABLET ORAL EVERY 8 HOURS PRN
Qty: 90 TABLET | Refills: 0 | Status: SHIPPED | OUTPATIENT
Start: 2025-05-21

## 2025-05-21 RX ORDER — METHOCARBAMOL 500 MG/1
500 TABLET, FILM COATED ORAL 3 TIMES DAILY PRN
Qty: 90 TABLET | Refills: 0 | Status: SHIPPED | OUTPATIENT
Start: 2025-05-21

## 2025-05-21 NOTE — TELEPHONE ENCOUNTER
Caller: OWEN TAYLOR    Relationship: OTHER     Requested Prescriptions:   Requested Prescriptions     Pending Prescriptions Disp Refills    methocarbamol (ROBAXIN) 500 MG tablet 90 tablet 0     Sig: Take 1 tablet by mouth 3 (Three) Times a Day As Needed for Muscle Spasms. Work to wean from use if possible.    HYDROcodone-acetaminophen (NORCO)  MG per tablet 90 tablet 0     Sig: Take 1 tablet by mouth Every 8 (Eight) Hours As Needed for Moderate Pain or Severe Pain. Take only as needed. Then next month 5 mg tablet every 8 hours.      Pharmacy where request should be sent: [facility] #[id number] - [place], [place] - [address] AT [facility] - [phone] [facility] [phone] FX     Last office visit with prescribing clinician: 4/11/2025   Last telemedicine visit with prescribing clinician: Visit date not found   Next office visit with prescribing clinician: Visit date not found     Does the patient have less than a 3 day supply:  [x] Yes  [] No    Would you like a call back once the refill request has been completed: [] Yes [x] No    If the office needs to give you a call back, can they leave a voicemail: [] Yes [x] No    Harjinder William Rep   05/21/25 12:29 EDT

## 2025-05-21 NOTE — TELEPHONE ENCOUNTER
LOV       4/11/2025  NOV          None  LF              hydrocodone:  4/09/2025                    Methocarbamol:  5/12/2025

## 2025-06-15 DIAGNOSIS — R63.5 WEIGHT GAIN: ICD-10-CM

## 2025-06-16 RX ORDER — ONDANSETRON 4 MG/1
TABLET, ORALLY DISINTEGRATING ORAL
Qty: 12 TABLET | Refills: 0 | Status: SHIPPED | OUTPATIENT
Start: 2025-06-16

## 2025-06-16 NOTE — TELEPHONE ENCOUNTER
LOV                   4/11/2025  NOV                  around 4/18/2025   Last refill            4/11/25   Protocol             met

## 2025-07-02 ENCOUNTER — OFFICE VISIT (OUTPATIENT)
Dept: FAMILY MEDICINE CLINIC | Facility: CLINIC | Age: 60
End: 2025-07-02
Payer: COMMERCIAL

## 2025-07-02 VITALS
OXYGEN SATURATION: 97 % | WEIGHT: 293 LBS | SYSTOLIC BLOOD PRESSURE: 130 MMHG | DIASTOLIC BLOOD PRESSURE: 84 MMHG | BODY MASS INDEX: 45.99 KG/M2 | HEIGHT: 67 IN | HEART RATE: 74 BPM

## 2025-07-02 DIAGNOSIS — G43.E09 CHRONIC MIGRAINE WITH AURA WITHOUT STATUS MIGRAINOSUS, NOT INTRACTABLE: ICD-10-CM

## 2025-07-02 DIAGNOSIS — E66.01 MORBID OBESITY: ICD-10-CM

## 2025-07-02 DIAGNOSIS — E11.43 DIABETIC AUTONOMIC NEUROPATHY ASSOCIATED WITH TYPE 2 DIABETES MELLITUS: ICD-10-CM

## 2025-07-02 DIAGNOSIS — Z12.11 SCREEN FOR COLON CANCER: ICD-10-CM

## 2025-07-02 DIAGNOSIS — R11.0 NAUSEA: ICD-10-CM

## 2025-07-02 DIAGNOSIS — I10 PRIMARY HYPERTENSION: ICD-10-CM

## 2025-07-02 DIAGNOSIS — E78.5 HYPERLIPIDEMIA, UNSPECIFIED HYPERLIPIDEMIA TYPE: ICD-10-CM

## 2025-07-02 DIAGNOSIS — Z12.4 SCREENING FOR CERVICAL CANCER: ICD-10-CM

## 2025-07-02 DIAGNOSIS — R35.0 URINARY FREQUENCY: Primary | ICD-10-CM

## 2025-07-02 DIAGNOSIS — R63.5 WEIGHT GAIN: ICD-10-CM

## 2025-07-02 DIAGNOSIS — Z12.31 ENCOUNTER FOR SCREENING MAMMOGRAM FOR MALIGNANT NEOPLASM OF BREAST: ICD-10-CM

## 2025-07-02 DIAGNOSIS — E11.9 TYPE 2 DIABETES MELLITUS WITHOUT COMPLICATION, WITHOUT LONG-TERM CURRENT USE OF INSULIN: ICD-10-CM

## 2025-07-02 DIAGNOSIS — I50.32 CHRONIC HEART FAILURE WITH PRESERVED EJECTION FRACTION (HFPEF): ICD-10-CM

## 2025-07-02 LAB
BILIRUB BLD-MCNC: NEGATIVE MG/DL
CLARITY, POC: CLEAR
COLOR UR: YELLOW
EXPIRATION DATE: ABNORMAL
GLUCOSE UR STRIP-MCNC: NEGATIVE MG/DL
KETONES UR QL: NEGATIVE
LEUKOCYTE EST, POC: NEGATIVE
Lab: ABNORMAL
NITRITE UR-MCNC: NEGATIVE MG/ML
PH UR: 6.5 [PH] (ref 5–8)
PROT UR STRIP-MCNC: ABNORMAL MG/DL
RBC # UR STRIP: ABNORMAL /UL
SP GR UR: 1.01 (ref 1–1.03)
UROBILINOGEN UR QL: NORMAL

## 2025-07-02 RX ORDER — NITROFURANTOIN 25; 75 MG/1; MG/1
100 CAPSULE ORAL 2 TIMES DAILY
Qty: 10 CAPSULE | Refills: 0 | Status: SHIPPED | OUTPATIENT
Start: 2025-07-02

## 2025-07-02 RX ORDER — ONDANSETRON 4 MG/1
4 TABLET, ORALLY DISINTEGRATING ORAL EVERY 12 HOURS PRN
Qty: 30 TABLET | Refills: 1 | Status: SHIPPED | OUTPATIENT
Start: 2025-07-02

## 2025-07-02 NOTE — PROGRESS NOTES
Chief Complaint  Urinary Frequency    Subjective        Erin Bailey presents to [facility]    History of Present Illness  Answers submitted by the patient for this visit:  Problem not listed (Submitted on 7/1/2025)  Chief Complaint: Other medical problem  Reason for appointment: ADA forms and Incontinence/frequent urination  abdominal pain: No  anorexia: No  joint pain: Yes  change in stool: No  chest pain: No  chills: No  nasal congestion: No  cough: No  diaphoresis: No  fatigue: No  fever: No  headaches: Yes  joint swelling: No  myalgias: No  nausea: No  neck pain: No  numbness: No  rash: No  sore throat: No  swollen glands: No  dysuria: No  vertigo: No  visual change: No  vomiting: No  weakness: No  Onset: 6 to 12 months  Chronicity: chronic  Frequency: constantly  Additional information: Started after multi uti's in rehab        The patient presents for a Medicare wellness visit and evaluation of urinary frequency, migraines, and medication management.    She reports experiencing frequent urination with an urgent need to go. This issue began during her rehabilitation period when she was dealing with urinary tract infections (UTIs). She does not believe she currently has a UTI, as she is not experiencing the usual burning sensation or cramping after urination. However, she notes that she often feels the need to urinate after sitting for a while or standing up, and she wakes up 2 to 3 times per night to use the bathroom. She is not taking any diuretics and only takes Eliquis every other day. She also mentions that she needs to urinate approximately every 3 hours, which can be challenging at work where she only gets 2 breaks.    She has been experiencing migraines since 1988, with each episode lasting between 1 to 3 days. Her symptoms include nausea, sensitivity to light and sound, and severe pain. She also experiences visual disturbances such as seeing sparkles before the onset  "of a migraine. Her job involves troubleshooting over the phone, which can be difficult during a migraine episode. She has requested a refill of Zofran sublingual for nausea, as she finds it more effective than other forms.    She continues to take tirzepatide injections weekly but has missed a few doses due to forgetfulness. She is considering returning to the initial dosage of 2.5 mg and is checking if she has any refills left. She recently refilled her prescription for methocarbamol, a muscle relaxant.    She has not received the Cologuard test kit that was ordered in 04/2025. She is interested in having a Pap smear done today.    She has an appointment with her cardiologist at the end of 09/2025 to discuss switching blood thinners.       Objective   Vital Signs:  /84 (BP Location: Left arm, Patient Position: Sitting, Cuff Size: Adult)   Pulse 74   Ht 170.2 cm (67\")   Wt (!) 155 kg (342 lb)   SpO2 97%   BMI 53.56 kg/m²   Estimated body mass index is 53.56 kg/m² as calculated from the following:    Height as of this encounter: 170.2 cm (67\").    Weight as of this encounter: 155 kg (342 lb).            Physical Exam  Vitals and nursing note reviewed.   Constitutional:       General: She is not in acute distress.     Appearance: Normal appearance. She is not ill-appearing.   HENT:      Head: Normocephalic and atraumatic.      Nose: Nose normal.      Mouth/Throat:      Mouth: Mucous membranes are moist.   Eyes:      Extraocular Movements: Extraocular movements intact.      Conjunctiva/sclera: Conjunctivae normal.   Cardiovascular:      Rate and Rhythm: Normal rate and regular rhythm.      Heart sounds: Normal heart sounds. No murmur heard.     No gallop.   Pulmonary:      Effort: Pulmonary effort is normal. No respiratory distress.      Breath sounds: Normal breath sounds. No stridor. No wheezing, rhonchi or rales.   Chest:      Chest wall: No tenderness.   Genitourinary:     Exam position: Lithotomy " position.      Pubic Area: No pubic lice.       Vagina: Normal.      Comments: Evidence of HS, some scars   Difficult to visualize cervix   Skin:     General: Skin is warm and dry.   Neurological:      General: No focal deficit present.      Mental Status: She is alert and oriented to person, place, and time. Mental status is at baseline.   Psychiatric:         Mood and Affect: Mood normal.         Behavior: Behavior normal.          Physical Exam         Result Review :               Results  Labs   - Urine sample: Shows a little bit of blood and protein          Assessment and Plan   Diagnoses and all orders for this visit:    1. Urinary frequency (Primary)  -     Urine Culture - Urine, Urine, Clean Catch  -     POCT urinalysis dipstick, automated  -     nitrofurantoin, macrocrystal-monohydrate, (Macrobid) 100 MG capsule; Take 1 capsule by mouth 2 (Two) Times a Day.  Dispense: 10 capsule; Refill: 0    2. Type 2 diabetes mellitus without complication, without long-term current use of insulin  -     Comprehensive metabolic panel    3. Primary hypertension  -     Comprehensive metabolic panel    4. Diabetic autonomic neuropathy associated with type 2 diabetes mellitus  -     Tirzepatide 2.5 MG/0.5ML solution auto-injector; Inject 2.5 mg under the skin into the appropriate area as directed 1 (One) Time Per Week.  Dispense: 2 mL; Refill: 0    5. Morbid obesity    6. Chronic heart failure with preserved ejection fraction (HFpEF)    7. Chronic migraine with aura without status migrainosus, not intractable    8. Hyperlipidemia, unspecified hyperlipidemia type  -     Cancel: Lipid panel  -     Lipid panel    9. Screen for colon cancer  -     Cologuard - Stool, Per Rectum; Future    10. BMI 50.0-59.9, adult  -     Tirzepatide 2.5 MG/0.5ML solution auto-injector; Inject 2.5 mg under the skin into the appropriate area as directed 1 (One) Time Per Week.  Dispense: 2 mL; Refill: 0    11. Weight gain  -     ondansetron ODT  "(ZOFRAN-ODT) 4 MG disintegrating tablet; Place 1 tablet on the tongue Every 12 (Twelve) Hours As Needed for Nausea or Vomiting.  Dispense: 30 tablet; Refill: 1    12. Nausea  -     ondansetron ODT (ZOFRAN-ODT) 4 MG disintegrating tablet; Place 1 tablet on the tongue Every 12 (Twelve) Hours As Needed for Nausea or Vomiting.  Dispense: 30 tablet; Refill: 1    13. Screening for cervical cancer  -     IGP, Aptima HPV, CtNg Age Gdln    14. Encounter for screening mammogram for malignant neoplasm of breast  -     Mammo Screening Digital Tomosynthesis Bilateral With CAD; Future        Assessment & Plan  1. Urinary frequency.  - Reports frequent urination approximately every 3 hours, including 2-3 times per night, starting after recurrent UTIs during rehab.  - Urine sample shows a little blood and protein; no burning or cramping symptoms currently.  - Urine culture will be conducted to rule out any current infection.  - If urine culture is normal and symptoms persist, medications to reduce urinary frequency may be considered, although they carry a risk of infection due to prolonged urine retention in the bladder.    2. Migraines.  - Experiences migraines 1-3 times a month with symptoms including nausea, light sensitivity, sound sensitivity, and significant pain.  - Sometimes experiences visual \"sparkles\" as an aura.  - Works from home, which helps manage symptoms.  - A refill for Zofran sublingual has been provided and sent to the pharmacy.    3. Medication management.  - Currently on tirzepatide but has missed some doses; prefers to restart at the 2.5 mg dose and then gradually increase.  - Prescription for tirzepatide 2.5 mg will be sent to the pharmacy.  - Recently refilled methocarbamol for muscle relaxation.    4. Health maintenance.  - A1c levels were satisfactory during the last check.  - Due for cholesterol profile, kidney function test, liver function test, and electrolyte panel; these tests will be ordered " today.  - Pap smear will be conducted during this visit.            Follow Up   Return in about 3 months (around 10/2/2025) for Chronic care.  Patient was given instructions and counseling regarding her condition or for health maintenance advice. Please see specific information pulled into the AVS if appropriate.           Patient or patient representative verbalized consent for the use of Ambient Listening during the visit with  Rosy Whitman DO for chart documentation. 7/2/2025  16:06 EDT

## 2025-07-03 LAB
ALBUMIN SERPL-MCNC: 4.1 G/DL (ref 3.5–5.2)
ALBUMIN/GLOB SERPL: 1.4 G/DL
ALP SERPL-CCNC: 107 U/L (ref 39–117)
ALT SERPL-CCNC: 9 U/L (ref 1–33)
AST SERPL-CCNC: 13 U/L (ref 1–32)
BILIRUB SERPL-MCNC: 0.3 MG/DL (ref 0–1.2)
BUN SERPL-MCNC: 17 MG/DL (ref 8–23)
BUN/CREAT SERPL: 15.9 (ref 7–25)
CALCIUM SERPL-MCNC: 9.3 MG/DL (ref 8.6–10.5)
CHLORIDE SERPL-SCNC: 101 MMOL/L (ref 98–107)
CHOLEST SERPL-MCNC: 151 MG/DL (ref 0–200)
CO2 SERPL-SCNC: 24.7 MMOL/L (ref 22–29)
CREAT SERPL-MCNC: 1.07 MG/DL (ref 0.57–1)
EGFRCR SERPLBLD CKD-EPI 2021: 59.6 ML/MIN/1.73
GLOBULIN SER CALC-MCNC: 3 GM/DL
GLUCOSE SERPL-MCNC: 86 MG/DL (ref 65–99)
HDLC SERPL-MCNC: 67 MG/DL (ref 40–60)
LDLC SERPL CALC-MCNC: 69 MG/DL (ref 0–100)
POTASSIUM SERPL-SCNC: 4.7 MMOL/L (ref 3.5–5.2)
PROT SERPL-MCNC: 7.1 G/DL (ref 6–8.5)
SODIUM SERPL-SCNC: 138 MMOL/L (ref 136–145)
TRIGL SERPL-MCNC: 77 MG/DL (ref 0–150)
VLDLC SERPL CALC-MCNC: 15 MG/DL (ref 5–40)

## 2025-07-04 LAB
BACTERIA UR CULT: NORMAL
BACTERIA UR CULT: NORMAL

## 2025-07-08 LAB
AGE GDLN ACOG TESTING: NORMAL
CYTOLOGIST CVX/VAG CYTO: NORMAL
CYTOLOGY CVX/VAG DOC CYTO: NORMAL
CYTOLOGY CVX/VAG DOC THIN PREP: NORMAL
DX ICD CODE: NORMAL
HPV GENOTYPE REFLEX: NORMAL
HPV I/H RISK 4 DNA CVX QL PROBE+SIG AMP: NEGATIVE
OTHER STN SPEC: NORMAL
SERVICE CMNT-IMP: NORMAL
STAT OF ADQ CVX/VAG CYTO-IMP: NORMAL

## 2025-07-09 ENCOUNTER — TELEPHONE (OUTPATIENT)
Dept: FAMILY MEDICINE CLINIC | Facility: CLINIC | Age: 60
End: 2025-07-09
Payer: COMMERCIAL

## 2025-07-09 NOTE — TELEPHONE ENCOUNTER
Ok for Hub to relay  Dr. Whitman would like to see you for a follow up around 9/30/2025. Please call the office at [phone] to schedule. [facility]

## 2025-07-21 DIAGNOSIS — S72.402G CLOSED FRACTURE OF DISTAL END OF LEFT FEMUR WITH DELAYED HEALING, UNSPECIFIED FRACTURE MORPHOLOGY, SUBSEQUENT ENCOUNTER: ICD-10-CM

## 2025-07-21 NOTE — TELEPHONE ENCOUNTER
Caller: Erin Bailey PALAK    Relationship: Self    Best call back number: [phone]    Requested Prescriptions:   Requested Prescriptions     Pending Prescriptions Disp Refills    HYDROcodone-acetaminophen (NORCO)  MG per tablet 90 tablet 0     Sig: Take 1 tablet by mouth Every 8 (Eight) Hours As Needed for Moderate Pain or Severe Pain. Take only as needed. Then next month 5 mg tablet every 8 hours.    methocarbamol (ROBAXIN) 500 MG tablet 90 tablet 0     Sig: Take 1 tablet by mouth 3 (Three) Times a Day As Needed for Muscle Spasms. Work to wean from use if possible.        Pharmacy where request should be sent: [id number] - [place], [place] - [address] [facility] - [phone] [facility] [phone] FX     Last office visit with prescribing clinician: 7/2/2025   Last telemedicine visit with prescribing clinician: Visit date not found   Next office visit with prescribing clinician: Visit date not found     Additional details provided by patient: PATIENT HAS 4 OF THE HYDRO LEFT    Does the patient have less than a 3 day supply:  [x] Yes  [] No    Would you like a call back once the refill request has been completed: [] Yes [x] No    If the office needs to give you a call back, can they leave a voicemail: [] Yes [x] No    Harjinder Morse Rep   07/21/25 10:56 EDT

## 2025-07-22 RX ORDER — METHOCARBAMOL 500 MG/1
500 TABLET, FILM COATED ORAL 3 TIMES DAILY PRN
Qty: 90 TABLET | Refills: 0 | Status: SHIPPED | OUTPATIENT
Start: 2025-07-22 | End: 2025-07-22

## 2025-07-22 RX ORDER — HYDROCODONE BITARTRATE AND ACETAMINOPHEN 10; 325 MG/1; MG/1
1 TABLET ORAL EVERY 8 HOURS PRN
Qty: 90 TABLET | Refills: 0 | Status: SHIPPED | OUTPATIENT
Start: 2025-07-22

## 2025-07-22 RX ORDER — METHOCARBAMOL 500 MG/1
TABLET, FILM COATED ORAL
Qty: 90 TABLET | Refills: 0 | Status: SHIPPED | OUTPATIENT
Start: 2025-07-22

## 2025-08-18 DIAGNOSIS — S72.402G CLOSED FRACTURE OF DISTAL END OF LEFT FEMUR WITH DELAYED HEALING, UNSPECIFIED FRACTURE MORPHOLOGY, SUBSEQUENT ENCOUNTER: ICD-10-CM

## 2025-08-19 RX ORDER — METHOCARBAMOL 500 MG/1
TABLET, FILM COATED ORAL
Qty: 90 TABLET | Refills: 0 | Status: SHIPPED | OUTPATIENT
Start: 2025-08-19